# Patient Record
Sex: MALE | Race: WHITE
[De-identification: names, ages, dates, MRNs, and addresses within clinical notes are randomized per-mention and may not be internally consistent; named-entity substitution may affect disease eponyms.]

---

## 2017-02-19 ENCOUNTER — HOSPITAL ENCOUNTER (EMERGENCY)
Dept: HOSPITAL 25 - ED | Age: 67
LOS: 1 days | Discharge: HOME | End: 2017-02-20
Payer: COMMERCIAL

## 2017-02-19 DIAGNOSIS — M51.26: Primary | ICD-10-CM

## 2017-02-19 DIAGNOSIS — M54.5: ICD-10-CM

## 2017-02-19 PROCEDURE — 96372 THER/PROPH/DIAG INJ SC/IM: CPT

## 2017-02-19 PROCEDURE — 72131 CT LUMBAR SPINE W/O DYE: CPT

## 2017-02-19 PROCEDURE — 99283 EMERGENCY DEPT VISIT LOW MDM: CPT

## 2017-02-20 VITALS — SYSTOLIC BLOOD PRESSURE: 115 MMHG | DIASTOLIC BLOOD PRESSURE: 70 MMHG

## 2017-02-20 NOTE — RAD
Indication: Intermittent LEFT side low back pain and difficulty ambulating for 2 weeks.

Multisystem disease.



Comparison: January 12, 2017 CT.



Technique: Noncontrast CT lumbar sacral spine. Multiplanar reformation.



Report: 0.9 cm short axis RIGHT retrocrural lymph node is unchanged. Peripheral vascular

disease without aortoiliac aneurysm. 1.6 cm cortical cyst mid pole RIGHT kidney. No

retroperitoneal hematoma within the field-of-view.



Negative for fracture or spondylolysis at any level.  Slight S-shaped curve to the spine

with dominant LEFT convex curve centered at L3-L4 and L4-L5.



Multilevel advanced degenerative spondylosis and facet joint osteoarthritis.



T12-L1: Facet joint osteoarthritis results in mild RIGHT foraminal stenosis.



L1-L2: Dorsal disc bulge and posterior element hypertrophic arthropathy results in mild

acquired central canal stenosis and mild RIGHT and moderate LEFT foraminal stenosis.



L2-L3: Annular disc bulge and posterior element hypertrophic arthropathy results in

moderate acquired central canal stenosis and mild RIGHT and moderate LEFT foraminal

stenosis.



L3-L4: Dorsal osteophytosis and annular disc bulge along with posterior element

hypertrophic arthropathy results in moderately severe acquired central canal stenosis as

well as moderate bilateral foraminal stenosis.



L4-L5: Dorsal osteophytosis and disc bulge along with advanced posterior element

hypertrophic arthropathy results in severe acquired central canal stenosis as well as

moderately severe bilateral foraminal stenosis.



L5-S1: Dorsal osteophytosis and disc bulge along with posterior element hypertrophic

arthropathy results in moderately severe acquired central canal stenosis as well as

moderate bilateral foraminal stenosis.



IMPRESSION: 

1. No evidence for fracture or traumatic malalignment.

2. Multilevel advanced degenerative spondylosis and posterior element hypertrophic

arthritis with resulting multilevel acquired central canal and foraminal stenosis as

described without significant interval change compared with the January 12, 2017 CT.

Characterization of disc pathology is most accurate with MRI or CT myelography if

clinically feasible.

## 2017-02-20 NOTE — ED
Mary GROSS Matthew, scribed for Rishi Bethea MD on 02/19/17 at 2314 .





Back Pain





- HPI Summary


HPI Summary: 


A 67 y/o male presents to the ED with intermittent lower back pain since two 

week ago. The pain is rated 10/10 in severity. He states that he woke-up one 

morning with the lower back pain, but denies any trauma. The pain initially 

worsened causing him difficulty ambulating then it subsided; however, the pain 

has returned. Its worse with movement. He denies other injuries and abdominal 

pain. The patient took Advil PTA.








- History of Current Complaint


Chief Complaint: EDBackInjuryPain


Stated Complaint: LOWER BACK PAIN


Time Seen by Provider: 02/19/17 22:34


Hx Obtained From: Patient


Onset/Duration: Lasting Weeks


Onset/Duration: Atraumatic, Still Present


Timing: Intermittent


Back Pain Location: Is Discrete @ - lower back


Severity Initially: Moderate


Severity Currently: Moderate


Pain Intensity: 10


Pain Scale Used: 0-10 Numeric


Aggravating Symptom(s): Movement, Walking


Associated Signs And Symptoms: Positive: Other - Difficulty ambulating.  

Negative: Abdominal Pain





- Allergies/Home Medications


Allergies/Adverse Reactions: 


 Allergies











Allergy/AdvReac Type Severity Reaction Status Date / Time


 


PACEMAKER-MRI CONTRAINDICATED Allergy Severe Unknown Uncoded 05/25/16 11:27





   Reaction  





   Details  














PMH/Surg Hx/FS Hx/Imm Hx


Endocrine/Hematology History: Reports: Hx Anticoagulant Therapy - coumadin, Hx 

Blood Transfusions, Hx Diabetes, Hx Unexplained Bleeding - too much coumadin 10 

years ago


   Denies: Hx Blood Disorders, Hx Systemic Lupus Erythematosus, Hx Sickle Cell 

Disease, Hx Thyroid Disease, Hx Anemia


Cardiovascular History: Reports: Hx Angina, Hx Angioplasty, Hx Auto Implanted 

Cardiovert Defib, Hx Cardiac Arrest, Hx Cardiomegaly, Hx Congestive Heart 

Failure, Hx Coronary Artery Disease, Hx Hypercholesterolemia, Hx Hypertension, 

Hx Pacemaker/ICD - PACEMAKER/DIBFIBRILLATOR 18 MONTHS AGO, Hx Syncope, Other 

Cardiovascular Problems/Disorders - IDDM      ON COUMADIN


   Denies: Hx Congenital Heart Disease, Hx Deep Vein Thrombosis, Hx Embolism, 

Hx Hypotension, Hx Peripheral Vascular Disease, Hx Rheumatic Fever, Hx Valvular 

Heart Disease


Respiratory History: Reports: Hx Pleural Effusion - 3 yrs ago, Hx Pneumonia - 

12 years ago, Hx Pulmonary Edema, Hx Seasonal Allergies, Hx Sleep Apnea


   Denies: Hx Asthma, Hx Chronic Bronchitis, Hx Chronic Obstructive Pulmonary 

Disease (COPD), Hx Cystic Fibrosis, Hx Lung Cancer, Hx Pulmonary Embolism


 History: Reports: Hx Kidney Stones - 2007


Sensory History: Reports: Hx Cataracts, Hx Contacts or Glasses - for reading


   Denies: Hx Eye Injury, Hx Eye Prosthesis, Hx Glaucoma, Hx Legally Blind, Hx 

Macular Degeneration, Hx Vision Problem, Hx Deafness, Hx Hearing Aid, Hx 

Hearing Problem


Opthamlomology History: Reports: Hx Cataracts, Hx Contacts or Glasses - for 

reading


   Denies: Hx Eye Injury, Hx Eye Prosthesis, Hx Glaucoma, Hx Legally Blind, Hx 

Macular Degeneration, Hx Vision Problem


Neurological History: Reports: Hx Nerve Disease - neuropathy in both feet


   Denies: Hx Dementia, Hx Developmental Delay, Hx Headaches, Hx Migraine, Hx 

Seizures, Hx Spinal Cord Injury, Hx Transient Ischemic Attacks (TIA), Other 

Neuro Impairments/Disorders


Psychiatric History: Reports: Hx Depression


   Denies: Hx Anxiety, Hx Attention Deficit Hyperactivity Disorder, Hx Eating 

Disorder, Hx Panic Disorder, Hx Post Traumatic Stress Disorder, Hx Inpatient 

Treatment, Hx Community Mental Health Tx, Hx Schizophrenia, Hx Bipolar Disorder

, Hx Suicide Attempt, Hx of Violent Episodes Against Others, Hx Substance Abuse





- Surgical History


Surgery Procedure, Year, and Place: TRANSESOPHAGEAL CARDIAC STUDY X3(TO CHECK 

VALVES); QUADRUPLE BYPASS; 6 STENTS(DOES NOT HAVE CARDS)LAST STENT PLACED 5/2016

; PACEMAKER


Hx Anesthesia Reactions: Yes - spouse states she was told he shouldnt have 

anesthesia again


Infectious Disease History: No


Infectious Disease History: Reports: Hx Shingles - 10 years ago


   Denies: Hx Clostridium Difficile, Hx Hepatitis, Hx Human Immunodeficiency 

Virus (HIV), Hx of Known/Suspected MRSA, Hx Tuberculosis, Hx Known/Suspected VRE

, Hx Known/Suspected VRSA, History Other Infectious Disease, Traveled Outside 

the [place] in Last 30 Days





- Family History


Known Family History: Positive: Cardiac Disease, Hypertension, Diabetes





- Social History


Alcohol Use: Rare


Alcohol Amount: 1-2 beers


Substance Use Type: Reports: None


Smoking Status (MU): Former Smoker





Review of Systems


Constitutional: Negative


Eyes: Negative


ENT: Negative


Cardiovascular: Negative


Respiratory: Negative


Gastrointestinal: Negative


Negative: Abdominal Pain


Genitourinary: Negative


Positive: Myalgia - lower back pain 


Skin: Negative


Neurological: Negative


Psychological: Normal


All Other Systems Reviewed And Are Negative: Yes





Physical Exam


Triage Information Reviewed: Yes


Vital Signs On Initial Exam: 


 Initial Vitals











Temp Pulse Resp BP Pulse Ox


 


 97.3 F   66   18   99/51   100 


 


 02/19/17 21:42  02/19/17 21:42  02/19/17 21:42  02/19/17 21:42  02/19/17 21:42











Vital Signs Reviewed: Yes


Appearance: Positive: Well-Appearing


Skin: Positive: Warm, Skin Color Reflects Adequate Perfusion, Dry


Head/Face: Positive: Normal Head/Face Inspection


Eyes: Positive: Normal


ENT: Positive: Normal ENT inspection


Neck: Positive: Supple, Nontender


Respiratory/Lung Sounds: Positive: Clear to Auscultation, Breath Sounds Present


Cardiovascular: Positive: RRR


Abdomen Description: Positive: Nontender, Soft


Bowel Sounds: Positive: Present


Musculoskeletal: Positive: Other - Positive Straight Leg Raise on the right


Neurological: Positive: Normal, Sensory/Motor Intact, Alert, Oriented to Person 

Place, Time


Psychiatric: Positive: Normal, Affect/Mood Appropriate





Diagnostics





- Vital Signs


 Vital Signs











  Temp Pulse Resp BP Pulse Ox


 


 02/19/17 21:42  97.3 F  66  18  99/51  100














- Laboratory


Lab Statement: Any lab studies that have been ordered have been reviewed, and 

results considered in the medical decision making process.





Back Pain Course/Dx





- Course


Course Of Treatment: Mr. Hines had a lot of DDD in nis lumbar spine and will 

likely continue to have flair ups.  He got a lot of relief from toradol here 

and I will treat him with ibuprofen and ativan as muscle relaxer.





- Diagnoses


Provider Diagnoses: 


 Herniated lumbar intervertebral disc








Discharge





- Discharge Plan


Condition: Stable


Disposition: HOME


Prescriptions: 


LORazepam TAB(*) [Ativan TAB(*)] 1 mg PO Q6H PRN #20 tab MDD 4


 PRN Reason: Pain


Patient Education Materials:  Lumbar Disc Herniation (ED)


Referrals: 


Segundo JAMES,Samuel PASCUAL [Primary Care Provider] - 


Additional Instructions: 


Use ibuprofen in addition to the muscle relaxer to decrease inflammation.





The documentation as recorded by the Mary del cid Matthew accurately 

reflects the service I personally performed and the decisions made by me, Rishi Bethea MD.

## 2017-04-01 NOTE — ED
Reji GROSS Erika, scribed for Chris Helm MD on 04/01/17 at 0600 .





HPI Chest Pain





- HPI Summary


HPI Summary: 


A 67-year-old male presents with his wife to the ED with a CC of intermittent 

chest pain. Patient reports he has been taking NTG for the past week due to 

chest pains, but had not been taking it for 5 years before. Patient had chest 

pain yesterday as well, which improved with the NTG. He woke up again at 03:30 

and developed the pain again. Pt also reports intermittent low BP and dizziness

, with associated SOB. Patient currently denies chest pain. Pt did take 324 mg 

ASA. Hx cardiac stents, CABG, EF less than 15%. Hx 12 MIs.





- History of Current Complaint


Chief Complaint: EDChestPainROMI


Time Seen by Provider: 04/01/17 05:42


Hx Obtained From: Patient, Family/Caretaker - Wife


Onset/Duration: Started Hours Ago, Atraumatic, Still Present


Initial Severity: Moderate


Pain Intensity: 6


Pain Scale Used: 0-10 Numeric


Associated Signs and Symptoms: Positive: Dizziness, Shortness of Breath





- Additional Pertinent History


Primary Care Physician: [id number]





- Allergy/Home Medications


Allergies/Adverse Reactions: 


 Allergies











Allergy/AdvReac Type Severity Reaction Status Date / Time


 


PACEMAKER-MRI CONTRAINDICATED Allergy Severe Unknown Uncoded 05/25/16 11:27





   Reaction  





   Details  











Home Medications: 


 Home Medications





Aspirin [Aspirin 81 MG TAB] 81 mg PO DAILY 04/01/17 [History Confirmed 04/01/17]


Nitroglycerin 0.4 mg SL SEE INSTRUCTIONS 04/01/17 [History Confirmed 04/01/17]


Vitamin E [Lora-E] 400 unit PO DAILY 04/01/17 [History Confirmed 04/01/17]











PMH/Surg Hx/FS Hx/Imm Hx


Endocrine/Hematology History: Reports: Hx Anticoagulant Therapy - coumadin, Hx 

Blood Transfusions, Hx Diabetes, Hx Unexplained Bleeding - too much coumadin 10 

years ago


   Denies: Hx Blood Disorders, Hx Systemic Lupus Erythematosus, Hx Sickle Cell 

Disease, Hx Thyroid Disease, Hx Anemia


Cardiovascular History: Reports: Hx Angina, Hx Angioplasty, Hx Auto Implanted 

Cardiovert Defib, Hx Cardiac Arrest, Hx Cardiomegaly, Hx Congestive Heart 

Failure, Hx Coronary Artery Disease, Hx Hypercholesterolemia, Hx Hypertension, 

Hx Pacemaker/ICD - PACEMAKER/DIBFIBRILLATOR 18 MONTHS AGO, Hx Syncope, Other 

Cardiovascular Problems/Disorders - IDDM      ON COUMADIN


   Denies: Hx Congenital Heart Disease, Hx Deep Vein Thrombosis, Hx Embolism, 

Hx Hypotension, Hx Peripheral Vascular Disease, Hx Rheumatic Fever, Hx Valvular 

Heart Disease


Respiratory History: Reports: Hx Pleural Effusion - 3 yrs ago, Hx Pneumonia - 

12 years ago, Hx Pulmonary Edema, Hx Seasonal Allergies, Hx Sleep Apnea


   Denies: Hx Asthma, Hx Chronic Bronchitis, Hx Chronic Obstructive Pulmonary 

Disease (COPD), Hx Cystic Fibrosis, Hx Lung Cancer, Hx Pulmonary Embolism


 History: Reports: Hx Kidney Stones - 2007


Sensory History: Reports: Hx Cataracts, Hx Contacts or Glasses - for reading


   Denies: Hx Eye Injury, Hx Eye Prosthesis, Hx Glaucoma, Hx Legally Blind, Hx 

Macular Degeneration, Hx Vision Problem, Hx Deafness, Hx Hearing Aid, Hx 

Hearing Problem


Opthamlomology History: Reports: Hx Cataracts, Hx Contacts or Glasses - for 

reading


   Denies: Hx Eye Injury, Hx Eye Prosthesis, Hx Glaucoma, Hx Legally Blind, Hx 

Macular Degeneration, Hx Vision Problem


Neurological History: Reports: Hx Nerve Disease - neuropathy in both feet


   Denies: Hx Dementia, Hx Developmental Delay, Hx Headaches, Hx Migraine, Hx 

Seizures, Hx Spinal Cord Injury, Hx Transient Ischemic Attacks (TIA), Other 

Neuro Impairments/Disorders


Psychiatric History: Reports: Hx Depression


   Denies: Hx Anxiety, Hx Attention Deficit Hyperactivity Disorder, Hx Eating 

Disorder, Hx Panic Disorder, Hx Post Traumatic Stress Disorder, Hx Inpatient 

Treatment, Hx Community Mental Health Tx, Hx Schizophrenia, Hx Bipolar Disorder

, Hx Suicide Attempt, Hx of Violent Episodes Against Others, Hx Substance Abuse





- Surgical History


Surgery Procedure, Year, and Place: TRANSESOPHAGEAL CARDIAC STUDY X3(TO CHECK 

VALVES); QUADRUPLE BYPASS; 6 STENTS(DOES NOT HAVE CARDS)LAST STENT PLACED 5/2016

; PACEMAKER


Hx Anesthesia Reactions: Yes - spouse states she was told he shouldnt have 

anesthesia again


Infectious Disease History: Reports: Hx Shingles - 10 years ago


   Denies: Hx Clostridium Difficile, Hx Hepatitis, Hx Human Immunodeficiency 

Virus (HIV), Hx of Known/Suspected MRSA, Hx Tuberculosis, Hx Known/Suspected VRE

, Hx Known/Suspected VRSA, History Other Infectious Disease, Traveled Outside 

the [place] in Last 30 Days





- Family History


Known Family History: Positive: Cardiac Disease, Hypertension, Diabetes





- Social History


Alcohol Use: Rare


Alcohol Amount: 1-2 beers


Substance Use Type: Reports: None


Hx Tobacco Use: Yes


Smoking Status (MU): Former Smoker





Review of Systems


Negative: Fever, Chills


Negative: Erythema


Negative: Sore Throat


Positive: Chest Pain


Positive: Shortness Of Breath.  Negative: Cough


Negative: Abdominal Pain, Vomiting, Nausea


Negative: dysuria, hematuria


Negative: Myalgia, Edema


Negative: Rash


Neurological: Other - dizziness


All Other Systems Reviewed And Are Negative: Yes





Physical Exam





- Summary


Physical Exam Summary: 


Constitutional: Well-developed, Well-nourished, Alert. (-) Distressed


Skin: Warm, Dry


HENT: Normocephalic; Atraumatic. Soft spoken


Eyes: Conjunctiva normal


Neck: Musculoskeletal ROM normal neck. (-) JVD, (-) Stridor, (-) Tracheal 

deviation


Cardio: Rhythm regular, rate normal, Heart sounds normal; Intact distal pulses; 

The pedal pulses are 2+ and symmetric. Radial pulses are 2+ and symmetric.  (-) 

Murmur


Pulmonary/Chest wall: Effort normal. (-) Respiratory distress, (-) Wheezes, (-) 

Rales


Abd: Soft, (-) Tenderness,  (-) Distension, (-) Guarding, (-) Rebound


Musculoskeletal: (-) Edema


Lymph: (-) Cervical adenopathy


Neuro: Alert, Oriented x3


Psych: Mood and affect Normal


Triage Information Reviewed: Yes


Vital Signs On Initial Exam: 


 Initial Vitals











Pulse Resp BP Pulse Ox


 


 110   12   110/70   100 


 


 04/01/17 05:10  04/01/17 05:10  04/01/17 05:10  04/01/17 05:10











Vital Signs Reviewed: Yes





Diagnostics





- Vital Signs


 Vital Signs











  Pulse Resp BP Pulse Ox


 


 04/01/17 05:10  110  12  110/70  100














- Laboratory


Lab Results: 


 Lab Results











  04/01/17 04/01/17 04/01/17 Range/Units





  05:24 05:24 05:24 


 


WBC  8.7    (3.5-10.8)  10^3/ul


 


RBC  4.21    (4.0-5.4)  10^6/ul


 


Hgb  12.4 L    (14.0-18.0)  g/dl


 


Hct  37 L    (42-52)  %


 


MCV  88    (80-94)  fL


 


MCH  29    (27-31)  pg


 


MCHC  33    (31-36)  g/dl


 


RDW  15    (10.5-15)  %


 


Plt Count  155    (150-450)  10^3/ul


 


MPV  9    (7.4-10.4)  um3


 


Neut % (Auto)  60.9    (38-83)  %


 


Lymph % (Auto)  24.4 L    (25-47)  %


 


Mono % (Auto)  11.2 H    (1-9)  %


 


Eos % (Auto)  2.2    (0-6)  %


 


Baso % (Auto)  1.3    (0-2)  %


 


Absolute Neuts (auto)  5.3    (1.5-7.7)  10^3/ul


 


Absolute Lymphs (auto)  2.1    (1.0-4.8)  10^3/ul


 


Absolute Monos (auto)  1.0 H    (0-0.8)  10^3/ul


 


Absolute Eos (auto)  0.2    (0-0.6)  10^3/ul


 


Absolute Basos (auto)  0.1    (0-0.2)  10^3/ul


 


Absolute Nucleated RBC  0.01    10^3/ul


 


Nucleated RBC %  0.1    


 


Sodium   134   (133-145)  mmol/L


 


Potassium   3.9   (3.5-5.0)  mmol/L


 


Chloride   100 L   (101-111)  mmol/L


 


Carbon Dioxide   27   (22-32)  mmol/L


 


Anion Gap   7   (2-11)  mmol/L


 


BUN   42 H   (6-24)  mg/dL


 


Creatinine   1.61 H   (0.67-1.17)  mg/dL


 


Est GFR ( Amer)   55.5   (>60)  


 


Est GFR (Non-Af Amer)   43.2   (>60)  


 


BUN/Creatinine Ratio   26.1 H   (8-20)  


 


Glucose   303 H   ()  mg/dL


 


Lactic Acid    1.1  (0.5-2.0)  mmol/L


 


Calcium   9.1   (8.6-10.3)  mg/dL


 


Total Bilirubin   0.50   (0.2-1.0)  mg/dL


 


AST   14   (13-39)  U/L


 


ALT   16   (7-52)  U/L


 


Alkaline Phosphatase   62   ()  U/L


 


Troponin I   0.04 H*   (<0.04)  ng/mL


 


Total Protein   7.5   (6.4-8.9)  g/dL


 


Albumin   4.1   (3.2-5.2)  g/dL


 


Globulin   3.4   (2-4)  g/dL


 


Albumin/Globulin Ratio   1.2   (1-3)  











Result Diagrams: 


 04/01/17 05:24





 04/01/17 05:24


Lab Statement: Any lab studies that have been ordered have been reviewed, and 

results considered in the medical decision making process.





- Radiology


  ** CXR


Radiology Interpretation Completed By: ED Physician - mild vascular congestion





- EKG


  ** 05:34


EKG Interpretation: Paced rhythm. ST depressions in V1-V3. No STEMI





Chest Pain Course/Dx





- Diagnoses


Provider Diagnoses: 


 Chest pain, unspecified








- Provider Notifications


Discussed Care Of Patient With: Dr. Garcia (cardiology) at 06:43 - Due to 

low EF, could not do cardiac cath or stress test here. If repeat troponin is 

elevated, transfer patient. If troponin is normal, discuss possible discharge 

with pt's cardiologist.





Discharge





- Discharge Plan


Condition: Stable


Disposition: OTHER


Discharge Disposition Comment: Signed out to Dr. Barboza pending repeat troponin


Referrals: 


Segundo JAMES,Samuel PASCUAL [Primary Care Provider] - 





The documentation as recorded by the Reji del cid Erika accurately reflects 

the service I personally performed and the decisions made by me, Chris Helm MD.

## 2017-04-01 NOTE — ED
Maryse GROSS Rebecca, scribed for Ghanshyam Barboza MD on 04/01/17 at 0853 .





Progress





- Progress Note


Progress Note: 


Pt was a signout from Dr. Helm. He had intermittent chest discomfort 

yesterday with radiation to the neck. At 2200 last night, he had a severe 

episode of discomfort with SOB. He took NTG which alleviated symptoms. 

Discomfort returned and patient came in to [facility] ED around 0500 this morning. His 

first troponin was 0.04. Repeat troponin 3 hours later was 0.04. ELKG showed no 

STEMI. Pt has no chest pain now. His lungs are clear and heart regular. Placed 

a call out to Dr. Herndon, waiting for Dr. Oneill to call back. 





Re-Evaluation





- Re-Evaluation


  ** First Eval


Re-Evaluation Time: 09:44


Change: Improved


Comment: Pt currently is not experiencing chest pain.





  ** Second Eval


Re-Evaluation Time: 10:15


Change: Improved


Comment: Continues to be pain free. Discussed the plan of action with pt and 

his wife. Both agree to pt's D/C and scheduling a followup with Dr. Herndon.





Course/Dx





- Course


Course Of Treatment: Pt was signed out from Dr. Helm. Troponin at 0840 is 0.04

, unchanged from 0524. Discussed care of pt with Dr. Schumacher, who is covering 

for Dr. Herndon and he stated that pt can be D/C to home and call Dr. Herndon on Monday to schedule a follow up. Pt will be D/C to home with a dx 

of chest pain and history of ischemic cardiomyopathy and a followup with Dr. Herndon.





- Diagnoses


Provider Diagnoses: 


 Chest pain, History of ischemic cardiomyopathy








- Provider Notifications


Discussed Care Of Patient With: Called out to Dr. Neely. Call returned by 

Dr. Oneill, who is covering for Dr. Herndon. Stated the 2 negative 

troponins and no evidence of STEMI on EKG. I discussed BP being a bit low and 

he felt like people with cardiomyopathy can have lower BP, that there isnt 

much we can do. Reported he can be D/C to home and call Dr. Herndon's 

office on Monday to schedule a cardiac catheterization.


Time Discussed With Above Provider: 09:57





The documentation as recorded by the Maryse del cid Rebecca accurately 

reflects the service I personally performed and the decisions made by me, Ghanshyam Barboza MD.

## 2017-04-01 NOTE — RAD
INDICATION:  Chest pain.



COMPARISON:  Comparison is made with a prior chest x-ray study from April 22, 2016.



TECHNIQUE: A portable view of the chest was obtained.



FINDINGS: The heart is moderately enlarged and unchanged. The patient is status post

sternotomy and coronary artery bypass surgery. There is a multilead transvenous pacemaker

defibrillator present.



There is mild diffuse prominence of the interstitial markings. No pleural effusion is

seen.



IMPRESSION:  FINDINGS SUGGESTIVE OF MILD CONGESTIVE HEART FAILURE.

## 2018-03-24 NOTE — ED
Back Pain





- HPI Summary


HPI Summary: 





Pt here w/ Lt buttock pain radiating into leg past few days. Worse w/ standing 

straight up. Better bending forward. Denies numbness, tingling, weakness. Has 

DDD - same pain in past - muscle relaxer helped. 





- History of Current Complaint


Chief Complaint: EDBackInjuryPain


Stated Complaint: BACK PAIN


Time Seen by Provider: 03/24/18 15:15


Pain Intensity: 6


Pain Scale Used: 0-10 Numeric





- Allergies/Home Medications


Allergies/Adverse Reactions: 


 Allergies











Allergy/AdvReac Type Severity Reaction Status Date / Time


 


duloxetine [From Cymbalta] AdvReac  Shakes Verified 03/24/18 15:19


 


PACEMAKER-MRI CONTRAINDICATED Allergy Severe Unknown Uncoded 05/25/16 11:27





   Reaction  





   Details  














PMH/Surg Hx/FS Hx/Imm Hx


Endocrine/Hematology History: Reports: Hx Anticoagulant Therapy - coumadin, Hx 

Blood Transfusions, Hx Diabetes, Hx Unexplained Bleeding - too much coumadin 10 

years ago


   Denies: Hx Blood Disorders, Hx Systemic Lupus Erythematosus, Hx Sickle Cell 

Disease, Hx Thyroid Disease, Hx Anemia


Cardiovascular History: Reports: Hx Angina, Hx Angioplasty, Hx Auto Implanted 

Cardiovert Defib, Hx Cardiac Arrest, Hx Cardiomegaly, Hx Congestive Heart 

Failure, Hx Coronary Artery Disease, Hx Hypercholesterolemia, Hx Hypertension, 

Hx Pacemaker/ICD - PACEMAKER/DIBFIBRILLATOR 18 MONTHS AGO, Hx Syncope, Other 

Cardiovascular Problems/Disorders - IDDM      ON COUMADIN


   Denies: Hx Congenital Heart Disease, Hx Deep Vein Thrombosis, Hx Embolism, 

Hx Hypotension, Hx Peripheral Vascular Disease, Hx Rheumatic Fever, Hx Valvular 

Heart Disease


Respiratory History: Reports: Hx Pleural Effusion - 3 yrs ago, Hx Pneumonia - 

12 years ago, Hx Pulmonary Edema, Hx Seasonal Allergies, Hx Sleep Apnea


   Denies: Hx Asthma, Hx Chronic Bronchitis, Hx Chronic Obstructive Pulmonary 

Disease (COPD), Hx Cystic Fibrosis, Hx Lung Cancer, Hx Pulmonary Embolism


 History: Reports: Hx Kidney Stones - 2007


Musculoskeletal History: 


   Denies: Hx Rheumatoid Arthritis, Hx Osteoporosis - OSTEOPENIA


Sensory History: Reports: Hx Cataracts, Hx Contacts or Glasses - for reading


   Denies: Hx Eye Injury, Hx Eye Prosthesis, Hx Glaucoma, Hx Legally Blind, Hx 

Macular Degeneration, Hx Vision Problem, Hx Deafness, Hx Hearing Aid, Hx 

Hearing Problem


Opthamlomology History: Reports: Hx Cataracts, Hx Contacts or Glasses - for 

reading


   Denies: Hx Eye Injury, Hx Eye Prosthesis, Hx Glaucoma, Hx Legally Blind, Hx 

Macular Degeneration, Hx Vision Problem


Neurological History: Reports: Hx Nerve Disease - neuropathy in both feet


   Denies: Hx Dementia, Hx Developmental Delay, Hx Headaches, Hx Migraine, Hx 

Seizures, Hx Spinal Cord Injury, Hx Transient Ischemic Attacks (TIA), Other 

Neuro Impairments/Disorders


Psychiatric History: Reports: Hx Depression


   Denies: Hx Anxiety, Hx Attention Deficit Hyperactivity Disorder, Hx Eating 

Disorder, Hx Panic Disorder, Hx Post Traumatic Stress Disorder, Hx Inpatient 

Treatment, Hx Community Mental Health Tx, Hx Schizophrenia, Hx Bipolar Disorder

, Hx Suicide Attempt, Hx of Violent Episodes Against Others, Hx Substance Abuse





- Surgical History


Surgery Procedure, Year, and Place: TRANSESOPHAGEAL CARDIAC STUDY X3(TO CHECK 

VALVES); QUADRUPLE BYPASS; 6 STENTS(DOES NOT HAVE CARDS)LAST STENT PLACED 5/2016

; PACEMAKER


Hx Anesthesia Reactions: Yes - spouse states she was told he shouldnt have 

anesthesia again


Infectious Disease History: No


Infectious Disease History: Reports: Hx Shingles - 10 years ago


   Denies: Hx Clostridium Difficile, Hx Hepatitis, Hx Human Immunodeficiency 

Virus (HIV), Hx of Known/Suspected MRSA, Hx Tuberculosis, Hx Known/Suspected VRE

, Hx Known/Suspected VRSA, History Other Infectious Disease, Traveled Outside 

the [place] in Last 30 Days





- Family History


Known Family History: Positive: Cardiac Disease, Hypertension, Diabetes





- Social History


Alcohol Use: Rare


Alcohol Amount: 1-2 beers


Substance Use Type: Reports: None


Hx Tobacco Use: Yes


Smoking Status (MU): Former Smoker





Physical Exam


Vital Signs On Initial Exam: 


 Initial Vitals











Temp Pulse Resp BP Pulse Ox


 


 97.9 F   57   14   0/0   97 


 


 03/24/18 14:48  03/24/18 14:48  03/24/18 14:48  03/24/18 14:48  03/24/18 14:48














Diagnostics





- Vital Signs


 Vital Signs











  Temp Pulse Resp BP Pulse Ox


 


 03/24/18 18:28  98.2 F  77  12  00/00  97


 


 03/24/18 18:03    19  


 


 03/24/18 15:40     92/0 


 


 03/24/18 14:48  97.9 F  57  14  0/0  97














- Laboratory


Lab Statement: Any lab studies that have been ordered have been reviewed, and 

results considered in the medical decision making process.





Discharge





- Discharge Plan


Condition: Stable


Disposition: HOME


Prescriptions: 


Diazepam TAB(*) [Valium TAB(*)] 5 mg PO TID PRN #15 tab MDD 3


 PRN Reason: Pain


Patient Education Materials:  Sciatica (ED)


Referrals: 


Segundo JAMES,Samuel PASCUAL [Primary Care Provider] - 


Additional Instructions: 


You appear to have sciatic nerve pain. This may be  related to a muscle spasm 

and/or spinal arthritis flair up. You have been provided with a muscle relaxer 

- this may make you drowsy - be cautious not to fall while taking. In addition 

to trying this, you may apply heat, perform gentle stretches and a topical 

analgesic such as Biofreeze.  Be cautious of other topical analgesics that may 

contain NSAIDs as you are arty on Coumadin.  If symptoms persist, follow-up 

with your PCP this week.  


*If you develop numbness, tingling, weakness, change in bowel or bladder habits

, return to the emergency department.





- Billing Disposition and Condition


Condition: STABLE


Disposition: HOME

## 2019-01-16 NOTE — ED
Complex/Multi-Sys Presentation





- HPI Summary


HPI Summary: 


A 69 y/o male presents to the ED c/o concern for PICC line. As per triage, 

"Patient presents with concerns of right PICC line placement. Patient reports 

"gurgling" sound when flushed with saline. Also LVAD patient. PICC placed at 

[facility] months ago". According to the patient, he is worried because when he 

was getting ready to clean it out and put antibiotics in, he heard some 

"gurgling" sound for the first time ever. His nurse stated that he should go to 

ED for CXR as it was clogged recently. 








- History Of Current Complaint


Chief Complaint: EDGeneral


Time Seen by Provider: 01/16/19 20:25


Hx Obtained From: Patient


Onset/Duration: Sudden Onset, Lasting Minutes, Still Present


Timing: Constant


Severity Currently: None


Character: Unable To Describe


Aggravating Factor(s): NOTHING


Alleviating Factor(s): NOTHING





- Allergies/Home Medications


Allergies/Adverse Reactions: 


 Allergies











Allergy/AdvReac Type Severity Reaction Status Date / Time


 


duloxetine [From Cymbalta] AdvReac  Shakes Verified 03/24/18 15:19


 


PACEMAKER-MRI CONTRAINDICATED Allergy Severe Unknown Uncoded 05/25/16 11:27





   Reaction  





   Details  














PMH/Surg Hx/FS Hx/Imm Hx


Endocrine/Hematology History: Reports: Hx Anticoagulant Therapy - coumadin, Hx 

Blood Transfusions, Hx Diabetes, Hx Unexplained Bleeding - too much coumadin 10 

years ago


   Denies: Hx Blood Disorders, Hx Systemic Lupus Erythematosus, Hx Sickle Cell 

Disease, Hx Thyroid Disease, Hx Anemia


Cardiovascular History: Reports: Hx Angina, Hx Angioplasty, Hx Auto Implanted 

Cardiovert Defib, Hx Cardiac Arrest, Hx Cardiomegaly, Hx Congestive Heart 

Failure, Hx Coronary Artery Disease, Hx Hypercholesterolemia, Hx Hypertension, 

Hx Pacemaker/ICD - PACEMAKER/DIBFIBRILLATOR 18 MONTHS AGO, Hx Syncope, Other 

Cardiovascular Problems/Disorders - IDDM      ON COUMADIN


   Denies: Hx Congenital Heart Disease, Hx Deep Vein Thrombosis, Hx Embolism, 

Hx Hypotension, Hx Peripheral Vascular Disease, Hx Rheumatic Fever, Hx Valvular 

Heart Disease


Respiratory History: Reports: Hx Pleural Effusion - 3 yrs ago, Hx Pneumonia - 

12 years ago, Hx Pulmonary Edema, Hx Seasonal Allergies, Hx Sleep Apnea


   Denies: Hx Asthma, Hx Chronic Bronchitis, Hx Chronic Obstructive Pulmonary 

Disease (COPD), Hx Cystic Fibrosis, Hx Lung Cancer, Hx Pulmonary Embolism


 History: Reports: Hx Kidney Stones - 2007


Musculoskeletal History: 


   Denies: Hx Rheumatoid Arthritis, Hx Osteoporosis - OSTEOPENIA


Sensory History: Reports: Hx Cataracts, Hx Contacts or Glasses - for reading


   Denies: Hx Eye Injury, Hx Eye Prosthesis, Hx Glaucoma, Hx Legally Blind, Hx 

Macular Degeneration, Hx Vision Problem, Hx Deafness, Hx Hearing Aid, Hx 

Hearing Problem


Opthamlomology History: Reports: Hx Cataracts, Hx Contacts or Glasses - for 

reading


   Denies: Hx Eye Injury, Hx Eye Prosthesis, Hx Glaucoma, Hx Legally Blind, Hx 

Macular Degeneration, Hx Vision Problem


Neurological History: Reports: Hx Nerve Disease - neuropathy in both feet


   Denies: Hx Dementia, Hx Developmental Delay, Hx Headaches, Hx Migraine, Hx 

Seizures, Hx Spinal Cord Injury, Hx Transient Ischemic Attacks (TIA), Other 

Neuro Impairments/Disorders


Psychiatric History: Reports: Hx Depression


   Denies: Hx Anxiety, Hx Attention Deficit Hyperactivity Disorder, Hx Eating 

Disorder, Hx Panic Disorder, Hx Post Traumatic Stress Disorder, Hx Inpatient 

Treatment, Hx Community Mental Health Tx, Hx Schizophrenia, Hx Bipolar Disorder

, Hx Suicide Attempt, Hx of Violent Episodes Against Others, Hx Substance Abuse





- Surgical History


Surgery Procedure, Year, and Place: TRANSESOPHAGEAL CARDIAC STUDY X3(TO CHECK 

VALVES); QUADRUPLE BYPASS; 6 STENTS(DOES NOT HAVE CARDS)LAST STENT PLACED 5/2016

; PACEMAKER


Hx Anesthesia Reactions: Yes - spouse states she was told he shouldnt have 

anesthesia again


Infectious Disease History: No


Infectious Disease History: Reports: Hx Shingles - 10 years ago


   Denies: Hx Clostridium Difficile, Hx Hepatitis, Hx Human Immunodeficiency 

Virus (HIV), Hx of Known/Suspected MRSA, Hx Tuberculosis, Hx Known/Suspected VRE

, Hx Known/Suspected VRSA, History Other Infectious Disease, Traveled Outside 

the [place] in Last 30 Days





- Family History


Known Family History: Positive: Cardiac Disease, Hypertension, Diabetes





- Social History


Alcohol Use: Rare


Alcohol Amount: 1-2 beers


Hx Substance Use: No


Substance Use Type: Reports: None


Hx Tobacco Use: Yes - not currently


Smoking Status (MU): Former Smoker





Review of Systems


Negative: Fever


Positive: Other - POSITIVE: CONCERN FOR PICC LINE


All Other Systems Reviewed And Are Negative: Yes





Physical Exam





- Summary


Physical Exam Summary: 


Appearance: Well-appearing, Well-nourished, lying in bed comfortable


Skin: Warm, dry, no obvious rash


Eyes: sclera anicteric, no conjunctival pallor


ENT: mucous membranes moist


Neck: deferred


Respiratory: No signs of respiratory distress


Cardiovascular: Appears well perfused, pulses are nml


Abdomen: deferred


Musculoskeletal: Moving all 4 extremities without obvious discomfort


Neurological: Awake and alert, mentation is normal, speech is fluent and 

appropriate


Psychiatric: affect is normal, does not appear anxious or depressed





Triage Information Reviewed: Yes


Vital Signs On Initial Exam: 


 Initial Vitals











Temp Pulse Resp BP Pulse Ox


 


 98.3 F   73   20   0/0   98 


 


 01/16/19 19:53  01/16/19 19:53  01/16/19 19:53  01/16/19 19:53  01/16/19 19:53











Vital Signs Reviewed: Yes





Diagnostics





- Vital Signs


 Vital Signs











  Temp Pulse Resp BP Pulse Ox


 


 01/16/19 19:53  98.3 F  73  20  0/0  98














- Laboratory


Lab Statement: Any lab studies that have been ordered have been reviewed, and 

results considered in the medical decision making process.





- Radiology


  ** CXR


Radiology Interpretation Completed By: ED Physician


Summary of Radiographic Findings: NO ACTIVE DISEASE. PICC LINE APPEARS NORMAL. 

PENDING OFFICIAL REPORT.





Complex Multi-Symp Course/Dx


Course Of Treatment: A 69 y/o male presents to the ED c/o concern for PICC 

line. According to the patient, he is worried because when he was getting ready 

to clean it out and put antibiotics in, he heard some "gurgling" sound for the 

first time ever. His nurse stated that he should go to ED for CXR as it was 

clogged recently. Physical examination was unremarkable. A CXR revealed no 

acute disease and PICC line appears to be normal. No laboratory screens were 

done. In the ED course, the patient received no medications. Patient will be 

discharged with a diagnosis of PICC line flush. Patient is to follow up with 

primary care provider as needed. Patient is to return to ED for any new or 

worsening symptoms. Patient is agreeable with this plan.





- Diagnoses


Provider Diagnoses: 


 PIC line (peripherally inserted central catheter) flush








Discharge





- Sign-Out/Discharge


Documenting (check all that apply): Patient Departure - DISCHARGE





- Discharge Plan


Condition: Good


Disposition: HOME


Patient Education Materials:  How to Flush Your PICC or Midline Catheter (ED)


Referrals: 


Segundo JAMES,Samuel PASCUAL [Primary Care Provider] - If Needed


Additional Instructions: 


Your PICC catheter appears to be functioning normally, and appears normal on 

the chest x ray. You can go ahead and continue to use it.





FOLLOW UP WITH PRIMARY CARE PROVIDER AS NEEDED.





RETURN TO ED FOR ANY NEW OR WORSENING SYMPTOMS.





- Attestation Statements


Document Initiated by Cass: Yes


Documenting Scribe: Robert Osorio


Provider For Whom Cass is Documenting (Include Credential): Rishi Briones MD


Scribe Attestation: 


I, Robert Osorio, scribed for Rishi Briones MD on 01/16/19 at 2114. 


Status of Scribe Document: Ready

## 2019-06-06 NOTE — ED
Dizziness





- HPI Summary


HPI Summary: 


Patient is a 68 y/o M presenting to ED with complaints of dizziness. He has had 

an LVAD in place for the past 2.5 years. Patient had recent abdominal surgery 

for LVAD pump infection around a week ago at [facility]. He was 

discharged six days ago. Today, patient began to experience dizziness, 

clamminess, nausea and light-headedness. He had been outside driving his riding 

 for around an hour when Sx onset. Wife reports that the patient goes 

in and out of afib. Patient states that he had his pump settings changed from 

5700 to 5800 RPM before his abdominal surgery. He states that he also had 

recent changes to his pacemaker settings, but he is unsure exactly what changes 

were made. Patient reports Hx of 18 MIs, multiple stents and cardiac catheters. 

He denies chest pain. Wife states that she believes the patient's ejection 

fracture to be under 20 for the patient. Pt denies any fever, chills, erythema 

of eyes, sore throat, CP, SOB, cough, abdominal pain, vomiting, dysuria, 

hematuria, myalgia, edema, rash. On triage, pain is rated 0/10. Nothing is 

noted to aggravate/alleviate Sx. Home medications and allergies are reviewed.  








- History Of Current Complaint


Chief Complaint: EDGeneral


Stated Complaint: LIGHT HEADED,DIZZY PER PT


Time Seen by Provider: 06/06/19 19:16


Hx Obtained From: Patient


Onset/Duration: Suddenly


Timing: Hours


Severity Currently: None


Character: Lightheaded, Dizzy


Aggravating Factor(s): Nothing


Alleviating Factor(s): Nothing


Associated Signs And Symptoms: Positive: Nausea, Other: - endorses dizziness, 

light-headedness, claminess; denies any fever, chills, erythema of eyes, sore 

throat, CP, SOB, cough, abdominal pain, vomiting, dysuria, hematuria, myalgia, 

edema, rash.  Negative: Vomiting, Chest Pain, SOB, Fever, Chills





- Allergies/Home Medications


Allergies/Adverse Reactions: 


 Allergies











Allergy/AdvReac Type Severity Reaction Status Date / Time


 


duloxetine [From Cymbalta] AdvReac  Shakes Verified 03/24/18 15:19


 


PACEMAKER-MRI CONTRAINDICATED Allergy Severe Unknown Uncoded 05/25/16 11:27





   Reaction  





   Details  











Home Medications: 


 Home Medications





Gabapentin 300 mg PO TID 06/06/19 [History Confirmed 06/06/19]


Metoprolol Rider/Hydrochlorothiaz [Metoprolol Succinate ER/H 25-12.5 mg] 1 tab PO 

DAILY 06/06/19 [History Confirmed 06/06/19]


Torsemide TAB* 40 mg PO DAILY 06/06/19 [History Confirmed 06/06/19]


Tramadol HCl [Tramadol HCl ER] 100 mg PO QID 06/06/19 [History Confirmed 06/06/ 19]


Warfarin Sodium 8 mg PO DAILY 06/06/19 [History Confirmed 06/06/19]











PMH/Surg Hx/FS Hx/Imm Hx


Endocrine/Hematology History: Reports: Hx Anticoagulant Therapy - coumadin, Hx 

Blood Transfusions, Hx Diabetes, Hx Unexplained Bleeding - too much coumadin 10 

years ago


   Denies: Hx Blood Disorders, Hx Systemic Lupus Erythematosus, Hx Sickle Cell 

Disease, Hx Thyroid Disease, Hx Anemia


Cardiovascular History: Reports: Hx Angina, Hx Angioplasty, Hx Auto Implanted 

Cardiovert Defib, Hx Cardiac Arrest, Hx Cardiomegaly, Hx Congestive Heart 

Failure, Hx Coronary Artery Disease, Hx Hypercholesterolemia, Hx Hypertension, 

Hx Pacemaker/ICD - PACEMAKER/DIBFIBRILLATOR 18 MONTHS AGO, Hx Syncope, Other 

Cardiovascular Problems/Disorders - IDDM      ON COUMADIN


   Denies: Hx Congenital Heart Disease, Hx Deep Vein Thrombosis, Hx Embolism, 

Hx Hypotension, Hx Peripheral Vascular Disease, Hx Rheumatic Fever, Hx Valvular 

Heart Disease


Respiratory History: Reports: Hx Pleural Effusion - 3 yrs ago, Hx Pneumonia - 

12 years ago, Hx Pulmonary Edema, Hx Seasonal Allergies, Hx Sleep Apnea


   Denies: Hx Asthma, Hx Chronic Bronchitis, Hx Chronic Obstructive Pulmonary 

Disease (COPD), Hx Cystic Fibrosis, Hx Lung Cancer, Hx Pulmonary Embolism


 History: Reports: Hx Kidney Stones - 2007


Musculoskeletal History: 


   Denies: Hx Rheumatoid Arthritis, Hx Osteoporosis - OSTEOPENIA


Sensory History: Reports: Hx Cataracts, Hx Contacts or Glasses - for reading


   Denies: Hx Eye Injury, Hx Eye Prosthesis, Hx Glaucoma, Hx Legally Blind, Hx 

Macular Degeneration, Hx Vision Problem, Hx Deafness, Hx Hearing Aid, Hx 

Hearing Problem


Opthamlomology History: Reports: Hx Cataracts, Hx Contacts or Glasses - for 

reading


   Denies: Hx Eye Injury, Hx Eye Prosthesis, Hx Glaucoma, Hx Legally Blind, Hx 

Macular Degeneration, Hx Vision Problem


Neurological History: Reports: Hx Nerve Disease - neuropathy in both feet


   Denies: Hx Dementia, Hx Developmental Delay, Hx Headaches, Hx Migraine, Hx 

Seizures, Hx Spinal Cord Injury, Hx Transient Ischemic Attacks (TIA), Other 

Neuro Impairments/Disorders


Psychiatric History: Reports: Hx Depression


   Denies: Hx Anxiety, Hx Attention Deficit Hyperactivity Disorder, Hx Eating 

Disorder, Hx Panic Disorder, Hx Post Traumatic Stress Disorder, Hx Inpatient 

Treatment, Hx Community Mental Health Tx, Hx Schizophrenia, Hx Bipolar Disorder

, Hx Suicide Attempt, Hx of Violent Episodes Against Others, Hx Substance Abuse





- Surgical History


Surgery Procedure, Year, and Place: TRANSESOPHAGEAL CARDIAC STUDY X3(TO CHECK 

VALVES); QUADRUPLE BYPASS; 6 STENTS(DOES NOT HAVE CARDS)LAST STENT PLACED 5/2016

; PACEMAKER


Hx Anesthesia Reactions: Yes - spouse states she was told he shouldnt have 

anesthesia again


Infectious Disease History: No


Infectious Disease History: Reports: Hx Shingles - 10 years ago


   Denies: Hx Clostridium Difficile, Hx Hepatitis, Hx Human Immunodeficiency 

Virus (HIV), Hx of Known/Suspected MRSA, Hx Tuberculosis, Hx Known/Suspected VRE

, Hx Known/Suspected VRSA, History Other Infectious Disease, Traveled Outside 

the [place] in Last 30 Days





- Family History


Known Family History: Positive: Cardiac Disease, Hypertension, Diabetes





- Social History


Alcohol Use: Rare


Alcohol Amount: 1-2 beers


Hx Substance Use: No


Substance Use Type: Reports: None


Hx Tobacco Use: Yes - not currently


Smoking Status (MU): Former Smoker





Review of Systems


Positive: Skin Diaphoresis - clamminess.  Negative: Fever, Chills


Negative: Erythema


Negative: Sore Throat


Negative: Chest Pain


Negative: Shortness Of Breath, Cough


Positive: Nausea


Negative: dysuria, hematuria


Negative: Myalgia, Edema


Negative: Rash


Neurological: Other - POSITIVE - DIZZINESS


All Other Systems Reviewed And Are Negative: Yes





Physical Exam





- Summary


Physical Exam Summary: 


Constitutional: Well-developed, Well-nourished, Alert. (-) Distressed


Skin: Warm, Dry; There is a left anterior abdominal wound with drainage of 

serosanguineous fluid. There is a left sub-scapular incision with erythema.


HENT: Normocephalic; Atraumatic


Eyes: Conjunctiva normal


Neck: Musculoskeletal ROM normal neck. (-) JVD, (-) Stridor, (-) Tracheal 

deviation


Cardio: No pulses palpable, there is a machine humming sound heard on 

auscultation


Pulmonary/Chest wall: Effort normal. (-) Respiratory distress, (-) Wheezes, (-) 

Rales


Abd: Soft, (-) tenderness, (-) Distension, (-) Guarding, (-) Rebound 


Musculoskeletal: (-) Edema


Lymph: (-) Cervical adenopathy


Neuro: Alert, Oriented x3


Psych: Mood and affect Normal








Triage Information Reviewed: Yes


Vital Signs On Initial Exam: 


 Initial Vitals











Temp Pulse Resp BP Pulse Ox


 


 97.9 F   0   0   0/0   0 


 


 06/06/19 19:04  06/06/19 19:04  06/06/19 19:04  06/06/19 19:04  06/06/19 19:04











Vital Signs Reviewed: Yes





Diagnostics





- Vital Signs


 Vital Signs











  Temp Pulse Resp BP Pulse Ox


 


 06/06/19 19:04  97.9 F  0  0  0/0  0














- Laboratory


Result Diagrams: 


 06/06/19 19:48





 06/06/19 19:48


Lab Statement: Any lab studies that have been ordered have been reviewed, and 

results considered in the medical decision making process.





- Radiology


  ** CXR


Radiology Interpretation Completed By: ED Physician


Summary of Radiographic Findings: Pacer, ICD noted, question left sided 

infiltrate, pending official report.





- EKG


  ** 2000


Cardiac Rate: Other Rate - afib with rate of 114 BPM


EKG Rhythm: Atrial Fibrillation


Summary of EKG Findings: EKG showed afib with rate of 114 BPM, no STEMI, no 

changes.





Re-Evaluation





- Re-Evaluation


  ** First Eval


Re-Evaluation Time: 20:48


Comment: Patient report given to [facility].





Dizzy Course/Dx





- Course


Course Of Treatment: Patient is a 68 y/o M presenting to ED with complaints of 

dizziness. He has had an LVAD in place for the past 2.5 years. Patient had 

recent abdominal surgery for LVAD pump infection around a week ago at [facility]. He was discharged six days ago. Today, patient began to 

experience dizziness, clamminess, nausea and light-headedness. He had been 

outside driving his riding  for around an hour when Sx onset. Wife 

reports that the patient goes in and out of afib. Patient states that he had 

his pump settings changed from 5700 to 5800 RPM before his abdominal surgery. 

He states that he also had recent changes to his pacemaker settings, but he is 

unsure exactly what changes were made. Patient reports Hx of 18 MIs, multiple 

stents and cardiac catheters. He denies chest pain. Wife states that she 

believes the patient's ejection fracture to be under 20 for the patient.  There 

is a left anterior abdominal wound with drainage of serosanguineous fluid. 

There is a left sub-scapular incision with erythema. No pulses palpable, there 

is a machine humming sound on auscultation.  EKG showed afib with rate of 114 

BPM, no STEMI, no changes.  On CXR, Pacer, ICD noted, question left sided 

infiltrate.  Labs showed trop 0.05, digoxin 0.3, lactic acid 2.3, WBC 14.5, RBC 

3.08, Hgb 9.2, Hct 28, RDW 16, myelocytes % 2,  chloride 93, anion gap 12, BUN 

26, creatinine 1.45, glucose 198, alk phos 184.  Fluids were not given due to 

the fact that the patient has LVAD and poor ejection fracture.  During ED course

, patient received Vancomycin HCl 1500 mg in sodium chloride, 250 mls @ 166.667 

mls/hr IVPB, Zosyn 3.375 gm in 100 mls @ 200 mls/hr IVPB.  Patient will be 

transferred to [facility] for his post-op infection and for continuation 

of care. 2114 - Patient's case was discussed with Dr. Downs, Dr. Downs accepts 

for direct admission of patient.





- Diagnoses


Provider Diagnoses: 


 Postoperative infection








- Provider Notifications


Discussed Care Of Patient With: ZITA DOWNS MD


Time Discussed With Above Provider: 21:14


Instructed by Provider To: Other - 2114 - Patient's case was discussed with Dr. Downs, Dr. Downs accepts for direct admission of patient.





- Critical Care Time


Critical Care Time:  min





Discharge





- Sign-Out/Discharge


Documenting (check all that apply): Patient Departure - admit


Patient Received Moderate/Deep Sedation with Procedure: No





- Discharge Plan


Condition: Good


Disposition: TRANS HIGHER LVL OF CARE FAC


Referrals: 


Segundo JAMES,Samuel PASCUAL [Primary Care Provider] - 





- Attestation Statements


Document Initiated by Scribe: Yes


Documenting Scribe: HERMINIO OTT


Provider For Whom Scribe is Documenting (Include Credential): KIRSTEN GOINS MD


Scribe Attestation: 


IHERMINIO, scribed for KIRSTEN GOINS MD on 06/06/19 at 2124. 


Status of Scribe Document: Ready

## 2019-11-24 NOTE — ED
HPI Cardiac





- HPI Summary


HPI Summary: 





This patient is a 69 year old M presenting to [facility] accompanied by wife with a 

chief complaint of begin shocked by defibrillator since after dinner. Pt has 

been shocked before. Pt recently changed his medications. Patient was jerking 

and then he was shocked. Pt has been in [facility], and his LVAD care is usually in 

[place].  Patient denies CP, and SOB.








- History of Current Complaint


Chief Complaint: EDGeneral


Stated Complaint: INTERNAL D-FIB FIRE PER EMS


Time Seen by Provider: 11/24/19 01:25


Hx Obtained From: Patient


Onset/Duration: Started Hours Ago


Timing: Intermittent


Current Severity: None


Pain Intensity: 0


Pain Scale Used: 0-10 Numeric


Character: Irregular


Aggravating Factor(s): Nothing


Alleviating Factor(s): Nothing


Associated Signs and Symptoms: Positive: Other: - jerking.  Negative: Chest Pain

, Shortness of Breath





- Additional Pertinent History


Primary Care Physician: RANJAN





- Allergy/Home Medications


Allergies/Adverse Reactions: 


 Allergies











Allergy/AdvReac Type Severity Reaction Status Date / Time


 


duloxetine [From Cymbalta] AdvReac  Shakes Verified 03/24/18 15:19


 


PACEMAKER-MRI CONTRAINDICATED Allergy Severe Unknown Uncoded 05/25/16 11:27





   Reaction  





   Details  











Home Medications: 


 Home Medications





Acetaminophen TAB* [Tylenol TAB*] 650 mg PO Q4H PRN 11/24/19 [History Confirmed 

11/24/19]


Ferrous Sulfate TAB* 325 mg PO DAILY 11/24/19 [History Confirmed 11/24/19]


Insulin Lispro [Humalog Kwikpen] 16 unit SUBCUT TID 11/24/19 [History Confirmed 

11/24/19]


Metoprolol Succinate XL TAB* [Toprol XL TAB*] 50 mg PO DAILY 11/24/19 [History 

Confirmed 11/24/19]


Polyethylene Glycol 3350* [Miralax*] 17 gm PO DAILY 11/24/19 [History Confirmed 

11/24/19]


Sennosides [Senna] 8.6 mg PO DAILY 11/24/19 [History Confirmed 11/24/19]


Spironolactone [Aldactone 25 MG-] 25 mg PO DAILY 11/24/19 [History Confirmed 11/ 24/19]


Torsemide TAB* [Demadex*] 20 mg PO DAILY 11/24/19 [History Confirmed 11/24/19]


Warfarin TAB(*) [Coumadin TAB(*)] 4 mg PO SEE INSTRUCTIONS 11/24/19 [History 

Confirmed 11/24/19]


amLODIPine TAB* [Norvasc 5 mg TAB*] 10 mg PO DAILY 11/24/19 [History Confirmed 

11/24/19]


rOPINIRole TAB* [Requip TAB*] 2 mg PO BEDTIME 11/24/19 [History Confirmed 11/24/ 19]











PMH/Surg Hx/FS Hx/Imm Hx


Endocrine/Hematology History: Reports: Hx Anticoagulant Therapy - coumadin, Hx 

Blood Transfusions, Hx Diabetes, Hx Unexplained Bleeding - too much coumadin 10 

years ago


   Denies: Hx Blood Disorders, Hx Systemic Lupus Erythematosus, Hx Sickle Cell 

Disease, Hx Thyroid Disease, Hx Anemia


Cardiovascular History: Reports: Hx Angina, Hx Angioplasty, Hx Auto Implanted 

Cardiovert Defib, Hx Cardiac Arrest, Hx Cardiomegaly, Hx Congestive Heart 

Failure, Hx Coronary Artery Disease, Hx Hypercholesterolemia, Hx Hypertension, 

Hx Pacemaker/ICD - PACEMAKER/DIBFIBRILLATOR 18 MONTHS AGO, Hx Syncope, Other 

Cardiovascular Problems/Disorders - IDDM      ON COUMADIN


   Denies: Hx Congenital Heart Disease, Hx Deep Vein Thrombosis, Hx Embolism, 

Hx Hypotension, Hx Peripheral Vascular Disease, Hx Rheumatic Fever, Hx Valvular 

Heart Disease


Respiratory History: Reports: Hx Pleural Effusion - 3 yrs ago, Hx Pneumonia - 

12 years ago, Hx Pulmonary Edema, Hx Seasonal Allergies, Hx Sleep Apnea


   Denies: Hx Asthma, Hx Chronic Bronchitis, Hx Chronic Obstructive Pulmonary 

Disease (COPD), Hx Cystic Fibrosis, Hx Lung Cancer, Hx Pulmonary Embolism


 History: Reports: Hx Kidney Stones - 2007


Musculoskeletal History: 


   Denies: Hx Rheumatoid Arthritis, Hx Osteoporosis - OSTEOPENIA


Sensory History: Reports: Hx Cataracts, Hx Contacts or Glasses - for reading


   Denies: Hx Eye Injury, Hx Eye Prosthesis, Hx Glaucoma, Hx Legally Blind, Hx 

Macular Degeneration, Hx Vision Problem, Hx Deafness, Hx Hearing Aid, Hx 

Hearing Problem


Opthamlomology History: Reports: Hx Cataracts, Hx Contacts or Glasses - for 

reading


   Denies: Hx Eye Injury, Hx Eye Prosthesis, Hx Glaucoma, Hx Legally Blind, Hx 

Macular Degeneration, Hx Vision Problem


Neurological History: Reports: Hx Nerve Disease - neuropathy in both feet


   Denies: Hx Dementia, Hx Developmental Delay, Hx Headaches, Hx Migraine, Hx 

Seizures, Hx Spinal Cord Injury, Hx Transient Ischemic Attacks (TIA), Other 

Neuro Impairments/Disorders


Psychiatric History: Reports: Hx Depression


   Denies: Hx Anxiety, Hx Attention Deficit Hyperactivity Disorder, Hx Eating 

Disorder, Hx Panic Disorder, Hx Post Traumatic Stress Disorder, Hx Inpatient 

Treatment, Hx Community Mental Health Tx, Hx Schizophrenia, Hx Bipolar Disorder

, Hx Suicide Attempt, Hx of Violent Episodes Against Others, Hx Substance Abuse





- Surgical History


Surgery Procedure, Year, and Place: TRANSESOPHAGEAL CARDIAC STUDY X3(TO CHECK 

VALVES); QUADRUPLE BYPASS; 6 STENTS(DOES NOT HAVE CARDS)LAST STENT PLACED 5/2016

; PACEMAKER


Hx Anesthesia Reactions: Yes - spouse states she was told he shouldnt have 

anesthesia again


Infectious Disease History: No


Infectious Disease History: Reports: Hx Shingles - 10 years ago


   Denies: Hx Clostridium Difficile, Hx Hepatitis, Hx Human Immunodeficiency 

Virus (HIV), Hx of Known/Suspected MRSA, Hx Tuberculosis, Hx Known/Suspected VRE

, Hx Known/Suspected VRSA, History Other Infectious Disease, Traveled Outside 

the [place] in Last 30 Days





- Family History


Known Family History: Positive: Cardiac Disease, Hypertension, Diabetes





- Social History


Alcohol Use: Rare


Alcohol Amount: 1-2 beers


Hx Substance Use: No


Substance Use Type: Reports: None


Hx Tobacco Use: Yes - not currently


Smoking Status (MU): Former Smoker





- Additional Comments


History Additional Comments: 





 Home Medications











 Medication  Instructions  Recorded  Confirmed  Type


 


Insulin GLARGINE(*) [Lantus(*)] 30 units SUBCUT BEDTIME 02/20/16 11/24/19 

History


 


Lisinopril TAB* [Prinivil TAB 10 5 mg PO DAILY 02/20/16 11/24/19 History





MG*]    


 


Ascorbic Acid [Vitamin C] 2,000 mg PO DAILY 01/19/17 11/24/19 History


 


Digoxin TAB* [Lanoxin TAB*] 125 mcg PO DAILY 01/19/17 11/24/19 History


 


Aspirin [Aspirin 81 MG TAB] 81 mg PO DAILY 04/01/17 11/24/19 History


 


Gabapentin 600 mg PO QID 06/06/19 11/24/19 History


 


Tramadol HCl [Tramadol HCl ER] 100 mg PO TID PRN 06/06/19 11/24/19 History


 


Warfarin Sodium 6 mg PO SEE INSTRUCTIONS 06/06/19 11/24/19 History


 


Acetaminophen TAB* [Tylenol TAB*] 650 mg PO Q4H PRN 11/24/19 11/24/19 History


 


Ferrous Sulfate TAB* 325 mg PO DAILY 11/24/19 11/24/19 History


 


Insulin Lispro [Humalog Kwikpen] 16 unit SUBCUT TID 11/24/19 11/24/19 History


 


Metoprolol Succinate XL TAB* 50 mg PO DAILY 11/24/19 11/24/19 History





[Toprol XL TAB*]    


 


Polyethylene Glycol 3350* 17 gm PO DAILY 11/24/19 11/24/19 History





[Miralax*]    


 


Sennosides [Senna] 8.6 mg PO DAILY 11/24/19 11/24/19 History


 


Spironolactone [Aldactone 25 MG-] 25 mg PO DAILY 11/24/19 11/24/19 History


 


Torsemide TAB* [Demadex*] 20 mg PO DAILY 11/24/19 11/24/19 History


 


Warfarin TAB(*) [Coumadin TAB(*)] 4 mg PO SEE INSTRUCTIONS 11/24/19 11/24/19 

History


 


amLODIPine TAB* [Norvasc 5 mg TAB*] 10 mg PO DAILY 11/24/19 11/24/19 History


 


rOPINIRole TAB* [Requip TAB*] 2 mg PO BEDTIME 11/24/19 11/24/19 History














Review of Systems


Negative: Chest Pain


Negative: Shortness Of Breath


Positive: Other - jerking


All Other Systems Reviewed And Are Negative: Yes





Physical Exam





- Summary


Physical Exam Summary: 





General: Elderly male. Mildly ill appearing. No acute distress.


HEENT: Normocephalic, Atraumatic. 


              Eyes: Conjuctiva normal, PERRL.


              Ears: TMs within normal limits.


              Nares: (-) discharge, (-) erythema.


              Oropharynx: Clear, mucous membranes moist, (-) exudates. 


Neck: Soft, FROM, (-) lymphadenopathy, (-) thyromegaly, (-) JVD.


Cardiovascular: Irregularly irregular, (-) murmur.


Lungs: Clear to auscultation bilaterally (-) wheezes, (-) rales, (-) rhonchi.


Abdomen: Soft, non-tender, non-distended, (-) organomegaly, normal bowel sounds.


Back: (-) CVA tenderness


Extremities: No edema.


Skin: Warm, dry, (-) rash.


Neuro: Alert and oriented x3, no focal deficits.


Psychiatric: Mood sleepy, affect normal.





Triage Information Reviewed: Yes


Vital Signs On Initial Exam: 


 Initial Vitals











Temp Pulse Resp BP Pulse Ox


 


 100.4 F   111   18   78/0   94 


 


 11/24/19 01:26  11/24/19 01:26  11/24/19 01:26  11/24/19 01:26  11/24/19 01:26











Vital Signs Reviewed: Yes





Procedures





- Sedation


Patient Received Moderate/Deep Sedation with Procedure: No





Diagnostics





- Vital Signs


 Vital Signs











  Temp Pulse Resp BP Pulse Ox


 


 11/24/19 01:36   113  22   93


 


 11/24/19 01:26  100.4 F  111  18  78/0  94














- Laboratory


Lab Results: 


 Lab Results











  11/24/19 Range/Units





  01:50 


 


WBC  31.1 H  (3.5-10.8)  10^3/uL


 


RBC  3.27 L  (4.18-5.48)  10^6 /uL


 


Hgb  10.0 L  (14.0-18.0)  g/dL


 


Hct  30 L  (42-52)  %


 


MCV  92  (80-94)  fL


 


MCH  31  (27-31)  pg


 


MCHC  34  (31-36)  g/dL


 


RDW  16 H  (10-15)  %


 


Plt Count  207  (150-450)  10^3/uL


 


MPV  8.1  (7.4-10.4)  fL


 


Neut % (Auto)  Pending  


 


Lymph % (Auto)  Pending  


 


Mono % (Auto)  Pending  


 


Eos % (Auto)  Pending  


 


Baso % (Auto)  Pending  


 


Absolute Neuts (auto)  Pending  


 


Absolute Lymphs (auto)  Pending  


 


Absolute Monos (auto)  Pending  


 


Absolute Eos (auto)  Pending  


 


Absolute Basos (auto)  Pending  


 


Absolute Nucleated RBC  Pending  


 


Nucleated RBC %  Pending  











Result Diagrams: 


 11/24/19 01:50





 11/24/19 01:50


Lab Statement: Any lab studies that have been ordered have been reviewed, and 

results considered in the medical decision making process.





- Radiology


  ** CXR


Radiology Interpretation Completed By: ED Physician


Summary of Radiographic Findings: CXR reveals, per ED physician, No infiltrate. 

No pleural effusion





- EKG


  ** 0125


EKG Rhythm: Atrial Fibrillation


Summary of EKG Findings: EKG at 0125 reveals atrial fib 126 BPM, no signal 

changes, no ischemic changes. This EKG was reviewed and interpreted by Dr. Lee.





Re-Evaluation





- Re-Evaluation


  ** First Eval


Re-Evaluation Time: 02:38


Comment: Discussed case with pt wife, pt has a LVAD infection, started on pills

, and then started on IV antibiotics, this Easter he went into sepsis.





Disposition





- Course


Course Of Treatment: 69 year old M sent from home by ambulance, after his ICD 

went off 4x at rest, pt also had 2 episodes on route and 3 episode in ED. He 

denies CP or SOB in between ICD firing. Pt had a LVAD implanted 3 years ago and 

has had problems with infection consistently since right after implantation. 

Most recently pt has been on meropenem through a picc line TID, his picc line 

dislodged on Tuesday and antiobiotics were held at that time. Work up here 

shows temp of 104 F upon arrival, sleepiness and mild confusion. WBC of 31, 

Lactic Acid 2.3. Troponin returned at 0.17. Interrogation of ICD demonstrated V 

Tach prior to each episode of firing. Cardiology on call consulted. Pt given 

dose of lidocaine and amiodarone drip started for possible LVAD site infection. 

Meropenem 2 g given. Pt accepts in transfer to [facility] by LVAD 

team and Dr. Muro.





- Diagnoses


Provider Diagnoses: 


 V tach, AICD (automatic cardioverter/defibrillator) present, Wound infection








- Physician Notifications


Discussed Care Of Patient With: Ethan Blood MD


Time Discussed With Above Provider: 02:14


Instructed by Provider To: Other - 02:14  Discussed case with Dr. Blood 02:59 

 Discussed case with [place] [id number]  Transfer center, lost the call about 3 

times, put through to a PA in ED 03:17  Dr. Muro accepting physician


Reason For Transfer: Specialty or service not available at [facility].





Discharge ED





- Sign-Out/Discharge


Documenting (check all that apply): Patient Departure - Transfer





- Discharge Plan


Condition: Stable


Disposition: TRANS HIGHER LVL OF CARE FAC


Referrals: 


Segundo JAMES,Samuel PASCUAL [Primary Care Provider] - 





- Billing Disposition and Condition


Condition: STABLE


Disposition: Trans Higher Lvl of Care Fac





- Attestation Statements


Document Initiated by Scribe: Yes


Documenting Scribe: Rebecca Preston


Provider For Whom Scribe is Documenting (Include Credential): Dr. Shira Lee MD 


Scribe Attestation: 


IRebecca, scribed for Dr. Shira Lee MD  on 11/24/19 at 0349. 


Scribe Documentation Reviewed: Yes


Provider Attestation: 


The documentation as recorded by the scribe, Rebecca Preston accurately 

reflects the service I personally performed and the decisions made by me, Dr. Shira Lee MD 


Status of Scribe Document: Viewed

## 2019-11-24 NOTE — PN
Progress Note





- Progress Note


Date of Service: 11/24/19


Note: 





Microbiology called at 4:45 PM to state preliminary aerobic blood culture 

positive for gram-negative bacilli.  Patient was already transferred to [facility]   Information has been forwarded to [facility] by sawyer clerk.

## 2019-11-26 NOTE — ED
Imaging and Labs Follow Up


Follow Up Type: Labs/Cultures


Labs/Culture Result: 





Blood cultures 2 resulted as pseudomonas aeruginosa.  This patient was 

transferred to [facility].  Information was sent to this facility.


Patient Communication/Plan: 





Patient being treated at [facility].  Impression transferred to 

this facility.  Nothing further at this time.


Provider Diagnoses: 


 V tach, AICD (automatic cardioverter/defibrillator) present, Wound infection

## 2019-12-18 NOTE — ED
Complex/Multi-Sys Presentation





- HPI Summary


HPI Summary: 


Patient is a 70 y/o M w/ LVAD who presents to [facility] with complaints of elevated 

INR, elevated WBC, generalized weakness, difficulty with ambulation, back pain, 

neck pain, right upper leg pain, and decreased PO intake. He states that he 

gets weekly blood work done at [facility] every Monday. Bloodwork was done 

12/16/19. He was called today, 12/18/19, with regards to abnormally elevated 

INR of 8.9 and elevated WBC. Patient states that his wife and Luis Miguel advised 

him to come to ED for evaluation. He also reports experiencing generalized 

weakness and difficulty with ambulation for the past two weeks. He also states, 

"I'm always tired". Back pain, neck pain, right upper leg pain, and decreased 

PO intake noted as well. No fever, cough, vomiting, diarrhea, dysuria reported. 

He reports chronic tingling in his fingers secondary to neuropathy. He takes 

Tylenol for his pain, with last dose being four hours ago. On RN assessment, 

nothing is noted to aggravate/alleviate Sx. Home medications and allergies are 

reviewed. 








- History Of Current Complaint


Chief Complaint: EDGeneral


Time Seen by Provider: 12/18/19 20:13


Hx Obtained From: Patient


Onset/Duration: Lasting Weeks - generalized weakness, Still Present


Timing: Constant, Weeks - generalized weakness


Location: Pain At: - back, neck, right upper leg


Aggravating Factor(s): nothing


Alleviating Factor(s): nothing


Associated Signs And Symptoms: Positive: Back Pain, Other - positive - elevated 

INR, elevated WBC, fatigue, neck pain, right upper leg pain, decreased PO 

intake.  Negative: Cough, Vomiting, Diarrhea, Dysuria, Fever





- Allergies/Home Medications


Allergies/Adverse Reactions: 


 Allergies











Allergy/AdvReac Type Severity Reaction Status Date / Time


 


duloxetine [From Cymbalta] AdvReac  Shakes Verified 12/18/19 19:50


 


PACEMAKER-MRI CONTRAINDICATED Allergy Severe Unknown Uncoded 12/18/19 19:50





   Reaction  





   Details  











Home Medications: 


 Home Medications





Acetaminophen [Acetaminophen Extra Strength] 1,000 mg PO Q8HR PRN 12/18/19 [

History Confirmed 12/18/19]


Amiodarone TAB* [Cordarone TAB*] 200 mg PO BID 12/18/19 [History Confirmed 12/18 /19]


Aspirin EC TAB* [Ecotrin EC Low Dose 81 MG*] 81 mg PO DAILY 12/18/19 [History 

Confirmed 12/18/19]


Calcium Carbonate 650 mg PO BID WITH MEALS 12/18/19 [History Confirmed 12/18/19]


Fluconazole 100 MG TAB* [Diflucan 100 MG TAB*] 400 mg PO DAILY 12/18/19 [

History Confirmed 12/18/19]


Gabapentin TAB(NF) [Neurontin 600 mg TAB(NF)] 600 mg PO QID 12/18/19 [History 

Confirmed 12/18/19]


Insulin Glargine,Hum.rec.anlog [Basaglar Kwikpen] 30 unit SUBCUT QPM 12/18/19 [

History Confirmed 12/18/19]


Insulin LISPRO* [HumaLOG*] 16 units SUBCUT TID WITH MEALS 12/18/19 [History 

Confirmed 12/18/19]


Magnesium Oxide TAB* [MagOx 400 TAB*] 400 mg PO BID 12/18/19 [History Confirmed 

12/18/19]


Melatonin [Meladox] 3 mg PO BEDTIME PRN 12/18/19 [History Confirmed 12/18/19]


Methyl Salicylate/Menth/Camph [Bengay Ultra Strength] 1 cre TOPICAL TID PRN 12/ 18/19 [History Confirmed 12/18/19]


Senna TAB 8.6 mg* [Senokot 8.6 mg TAB*] 1 tab PO QPM 12/18/19 [History 

Confirmed 12/18/19]


Torsemide 10 mg PO DAILY 12/18/19 [History Confirmed 12/18/19]


Warfarin TAB(*) [Coumadin TAB(*)] 6 mg PO DAILY 12/18/19 [History Confirmed 12/ 18/19]


rOPINIRole TAB* [Requip TAB*] 1 mg PO QID 12/18/19 [History Confirmed 12/18/19]


traMADol TAB* [Ultram*] 50 mg PO Q4HR PRN 12/18/19 [History Confirmed 12/18/19]











PMH/Surg Hx/FS Hx/Imm Hx


Endocrine/Hematology History: Reports: Hx Anticoagulant Therapy - coumadin, Hx 

Blood Transfusions, Hx Diabetes, Hx Unexplained Bleeding - too much coumadin 10 

years ago


   Denies: Hx Blood Disorders, Hx Systemic Lupus Erythematosus, Hx Sickle Cell 

Disease, Hx Thyroid Disease, Hx Anemia


Cardiovascular History: Reports: Hx Angina, Hx Angioplasty, Hx Auto Implanted 

Cardiovert Defib, Hx Cardiac Arrest, Hx Cardiomegaly, Hx Congestive Heart 

Failure, Hx Coronary Artery Disease, Hx Hypercholesterolemia, Hx Hypertension, 

Hx Pacemaker/ICD - PACEMAKER/DIBFIBRILLATOR 18 MONTHS AGO, Hx Syncope, Other 

Cardiovascular Problems/Disorders - IDDM      ON COUMADIN


   Denies: Hx Congenital Heart Disease, Hx Deep Vein Thrombosis, Hx Embolism, 

Hx Hypotension, Hx Peripheral Vascular Disease, Hx Rheumatic Fever, Hx Valvular 

Heart Disease


Respiratory History: Reports: Hx Pleural Effusion - 3 yrs ago, Hx Pneumonia - 

12 years ago, Hx Pulmonary Edema, Hx Seasonal Allergies, Hx Sleep Apnea


   Denies: Hx Asthma, Hx Chronic Bronchitis, Hx Chronic Obstructive Pulmonary 

Disease (COPD), Hx Cystic Fibrosis, Hx Lung Cancer, Hx Pulmonary Embolism


 History: Reports: Hx Kidney Stones - 2007


Musculoskeletal History: 


   Denies: Hx Rheumatoid Arthritis, Hx Osteoporosis - OSTEOPENIA


Sensory History: Reports: Hx Cataracts, Hx Contacts or Glasses - for reading


   Denies: Hx Eye Injury, Hx Eye Prosthesis, Hx Glaucoma, Hx Legally Blind, Hx 

Macular Degeneration, Hx Vision Problem, Hx Deafness, Hx Hearing Aid, Hx 

Hearing Problem


Opthamlomology History: Reports: Hx Cataracts, Hx Contacts or Glasses - for 

reading


   Denies: Hx Eye Injury, Hx Eye Prosthesis, Hx Glaucoma, Hx Legally Blind, Hx 

Macular Degeneration, Hx Vision Problem


Neurological History: Reports: Hx Nerve Disease - neuropathy in both feet


   Denies: Hx Dementia, Hx Developmental Delay, Hx Headaches, Hx Migraine, Hx 

Seizures, Hx Spinal Cord Injury, Hx Transient Ischemic Attacks (TIA), Other 

Neuro Impairments/Disorders


Psychiatric History: Reports: Hx Depression


   Denies: Hx Anxiety, Hx Attention Deficit Hyperactivity Disorder, Hx Eating 

Disorder, Hx Panic Disorder, Hx Post Traumatic Stress Disorder, Hx Inpatient 

Treatment, Hx Community Mental Health Tx, Hx Schizophrenia, Hx Bipolar Disorder

, Hx Suicide Attempt, Hx of Violent Episodes Against Others, Hx Substance Abuse





- Surgical History


Surgery Procedure, Year, and Place: TRANSESOPHAGEAL CARDIAC STUDY X3(TO CHECK 

VALVES); QUADRUPLE BYPASS; 6 STENTS(DOES NOT HAVE CARDS)LAST STENT PLACED 5/2016

; PACEMAKER


Hx Anesthesia Reactions: Yes - spouse states she was told he shouldnt have 

anesthesia again


Infectious Disease History: No


Infectious Disease History: Reports: Hx Shingles - 10 years ago


   Denies: Hx Clostridium Difficile, Hx Hepatitis, Hx Human Immunodeficiency 

Virus (HIV), Hx of Known/Suspected MRSA, Hx Tuberculosis, Hx Known/Suspected VRE

, Hx Known/Suspected VRSA, History Other Infectious Disease, Traveled Outside 

the [place] in Last 30 Days





- Family History


Known Family History: Positive: Cardiac Disease, Hypertension, Diabetes





- Social History


Alcohol Use: Rare


Alcohol Amount: 1-2 beers


Hx Substance Use: No


Substance Use Type: Reports: None


Hx Tobacco Use: Yes - not currently


Smoking Status (MU): Former Smoker





Review of Systems





- ROS Summary


Review of Systems Summary: 


Home Medications











 Medication  Instructions  Recorded  Confirmed  Type


 


Lisinopril TAB* [Prinivil TAB 10 5 mg PO DAILY 02/20/16 11/24/19 History





MG*]    


 


Ferrous Sulfate TAB* 325 mg PO DAILY 11/24/19 11/24/19 History


 


Metoprolol Succinate XL TAB* 50 mg PO DAILY 11/24/19 11/24/19 History





[Toprol XL TAB*]    


 


Polyethylene Glycol 3350* 17 gm PO DAILY 11/24/19 11/24/19 History





[Miralax*]    


 


Spironolactone [Aldactone 25 MG-] 25 mg PO DAILY 11/24/19 11/24/19 History


 


amLODIPine TAB* [Norvasc 5 mg TAB*] 10 mg PO DAILY 11/24/19 11/24/19 History


 


Acetaminophen [Acetaminophen Extra 1,000 mg PO Q8HR PRN 12/18/19 12/18/19 

History





Strength]    


 


Amiodarone TAB* [Cordarone TAB*] 200 mg PO BID 12/18/19 12/18/19 History


 


Aspirin EC TAB* [Ecotrin EC Low 81 mg PO DAILY 12/18/19 12/18/19 History





Dose 81 MG*]    


 


Calcium Carbonate 650 mg PO BID WITH MEALS 12/18/19 12/18/19 History


 


Fluconazole 100 MG TAB* [Diflucan 400 mg PO DAILY 12/18/19 12/18/19 History





100 MG TAB*]    


 


Gabapentin TAB(NF) [Neurontin 600 600 mg PO QID 12/18/19 12/18/19 History





mg TAB(NF)]    


 


Insulin Glargine,Hum.rec.anlog 30 unit SUBCUT QPM 12/18/19 12/18/19 History





[Basaglar Kwikpen]    


 


Insulin LISPRO* [HumaLOG*] 16 units SUBCUT TID WITH MEALS 12/18/19 12/18/19 

History


 


Magnesium Oxide TAB* [MagOx 400 400 mg PO BID 12/18/19 12/18/19 History





TAB*]    


 


Melatonin [Meladox] 3 mg PO BEDTIME PRN 12/18/19 12/18/19 History


 


Methyl Salicylate/Menth/Camph 1 cre TOPICAL TID PRN 12/18/19 12/18/19 History





[Bengay Ultra Strength]    


 


Senna TAB 8.6 mg* [Senokot 8.6 mg 1 tab PO QPM 12/18/19 12/18/19 History





TAB*]    


 


Torsemide 10 mg PO DAILY 12/18/19 12/18/19 History


 


Warfarin TAB(*) [Coumadin TAB(*)] 6 mg PO DAILY 12/18/19 12/18/19 History


 


rOPINIRole TAB* [Requip TAB*] 1 mg PO QID 12/18/19 12/18/19 History


 


traMADol TAB* [Ultram*] 50 mg PO Q4HR PRN 12/18/19 12/18/19 History














Constitutional: Other - positive - elevated WBC and INR 


Positive: Fatigue.  Negative: Fever


Negative: Cough


Gastrointestinal: Other - positive - decreased PO intake 


Negative: Vomiting, Diarrhea


Negative: dysuria


Musculoskeletal: Other - positive - neck pain, back pain, right upper leg pain 


All Other Systems Reviewed And Are Negative: Yes





Physical Exam





- Summary


Physical Exam Summary: 


General: Well-developed, Well-nourished, Moderately Ill-Appearing Male. No 

acute distress.


HEENT: Normocephalic, Atraumatic. 


              Eyes: Conjuctiva normal, PERRL.


              Oropharynx: Clear, mucous membranes moist, (-) exudates. 


Neck: Soft, FROM, (-) lymphadenopathy, (-) thyromegaly, (-) JVD.


Cardiovascular: Normal sinus rhythm, (-) murmur.


Lungs: Decreased breath sounds bilaterally and bibasilar crackles are noted; (-

) wheezes, (-) rales, (-) rhonchi.


Abdomen: Soft, non-tender, non-distended, (-) organomegaly, normal bowel sounds.


Back: (-) CVA tenderness


Extremities: Venous stasis changes and trace BLE edema 


Skin: Warm, dry, (-) rash.


Neuro: Alert and oriented x3, no focal deficits.


Psychiatric: Mood normal, affect normal.








Triage Information Reviewed: Yes


Vital Signs On Initial Exam: 


 Initial Vitals











Temp Pulse Resp BP Pulse Ox


 


 98 F   80   20   72/0   0 


 


 12/18/19 19:37  12/18/19 19:37  12/18/19 19:37  12/18/19 19:37  12/18/19 19:37











Vital Signs Reviewed: Yes





Procedures





- Sedation


Patient Received Moderate/Deep Sedation with Procedure: No





Diagnostics





- Vital Signs


 Vital Signs











  Temp Pulse Resp BP Pulse Ox


 


 12/18/19 19:37  98 F  80  20  72/0  0














- Laboratory


Result Diagrams: 


 12/18/19 20:15





 12/18/19 20:15


Lab Statement: Any lab studies that have been ordered have been reviewed, and 

results considered in the medical decision making process.





- Radiology


  ** CXR


Radiology Interpretation Completed By: ED Physician


Summary of Radiographic Findings: INCREASED INTERSTITIAL MARKINGS, CONSISTENT 

WITH FLUID OVERLOAD, PENDING OFFICIAL REPORT.





- EKG


  ** 2036


Cardiac Rate: NL - rate of 86 BPM


EKG Rhythm: Sinus Rhythm


Summary of EKG Findings: EKG at 2036 reveals normal sinus rhythm with rate of 

86 BPM, no STEMI, there is significant global artifact from LVAD. This EKG was 

reviewed and interpreted by Dr. Lee.





Re-Evaluation





- Re-Evaluation


  ** First Eval


Re-Evaluation Time: 22:53


Comment: Nurse Barrera spoke with Dr. Taylor from [facility]. 10 

mg IV vitamin K and discharge to home with outpatient follow up is recommended.





Complex Multi-Symp Course/Dx


Course Of Treatment: 69-year-old male presents from home by ambulance with 

complaints of elevated INR and weakness.  He states he follows with the LVAD 

team in [place].  They get his blood work drawn every Monday.  They have been 

following it and it has been high.  He states his wife was concerned about this 

so he came to the ER.  He also states he's been weak over the last 2 weeks.  

Sleeping more than usual.  No fevers.  No vomiting or diarrhea.  No urinary 

complaints.  Workup demonstrates INR greater than 10.  No other significant 

acute changes.  Context with LVAD team made.  They recommend vitamin K, 10 

units IV.  Discharged to home.  Follow up with them tomorrow.  During ED course

, patient received vitamin K as advised.  Also received his usual medicines of 

tramadol and meropenem.  Tramadol 50 mg PO, gabapentin.





- Diagnoses


Provider Diagnoses: 


 Elevated INR, Generalized weakness








Discharge ED





- Sign-Out/Discharge


Documenting (check all that apply): Patient Departure - discharge 





- Discharge Plan


Condition: Stable


Disposition: HOME


Patient Education Materials:  Weakness (ED), Elevated INR (ED)


Referrals: 


Segundo JAMES,Samuel PASCUAL [Primary Care Provider] - 1 Day


Additional Instructions: 


PLEASE RETURN TO ED FOR ANY NEW OR WORSENING SYMPTOMS. PLEASE FOLLOW UP WITH 

YOUR DOCTORS AT [facility].





- Billing Disposition and Condition


Condition: STABLE


Disposition: Home





- Attestation Statements


Document Initiated by Scribe: Yes


Documenting Scribe: HERMINIO OTT


Provider For Whom Zoltanibe is Documenting (Include Credential): ENDER LEE MD


Scribe Attestation: 


I, HERMINIO OTT, scribed for ENDER LEE MD on 12/19/19 at 0534. 


Scribe Documentation Reviewed: Yes


Provider Attestation: 


The documentation as recorded by the scribeHERMINIO accurately reflects 

the service I personally performed and the decisions made by me, ENDER LEE MD


Status of Scribe Document: Viewed

## 2019-12-19 NOTE — ED
Imaging and Labs Follow Up


Follow Up Type: Imaging


Imaging Result: 


IMPRESSION: #. Pulmonary vascular congestion and interstitial edema. #. 

Additional patchy bilateral alveolar opacities concerning for potential 

superimposed pneumonia. Correlate with clinical assessment. #. Negative for 

pleural effusions.





Patient Communication/Plan: 


patient is know to have pulmonary vascular congestion and high wbc. has follow 

up with strong who is managing care so will not change anything at this time





Provider Diagnoses: 


 Elevated INR, Generalized weakness

## 2019-12-19 NOTE — ED
Complex/Multi-Sys Presentation





- HPI Summary


HPI Summary: 


Patient is a 70 y/o M w/ LVAD who presents to [facility] with positive blood culture 

for gram negative bacilli in one aerobic bottle. Patient was evaluated during 

evening of 12/18/19 for elevated INR. Patient's LVAD team from [facility] 

was contacted, it was recommended that the patient receive vitamin K and be 

discharged to home with follow up at [place]. Patient had contacted LVAD team 

when he was informed of positive blood cultures today, 12/19/19. It was 

recommended that the patient come to [facility] for evaluation and then be 

subsequently send to [facility] for further workup. Patient is noted to be 

currently on meropenem. In the room, patient states that he feels like  "

someone has been throwing baseballs at me". Some SOB and left hip pain are 

endorsed. Family in room claim that the patient has been rapidly declining over 

the past few weeks. They report that the patient has been having memory loss 

and "dementia issues". They state that the patient had claimed he had been 

experiencing a "leaving his body" sensation. Generalized weakness is also 

endorsed, it is noted that there have been some episodes when the patient would 

sit on the floor secondary to fatigue. Family notes that the patient's sclera 

have appeared icteric for the past few days as well. On RN assessment, nothing 

is noted to aggravate/alleviate Sx. PMHx of diabetes, cardiac arrest, CHF, CAD, 

HTN, MI, PNA, PE, sleep apnea noted. Home medications and allergies are 

reviewed. 





- History Of Current Complaint


Chief Complaint: EDGeneral


Time Seen by Provider: 12/19/19 20:08


Hx Obtained From: Patient, Family/Caretaker


Onset/Duration: Lasting Weeks, Still Present


Timing: Constant


Location: Pain At: - left hip


Aggravating Factor(s): nothing


Alleviating Factor(s): nothing


Associated Signs And Symptoms: Positive: Weakness - generalized, SOB, Other - 

positive - left hip pain, generalized weakness and fatigue, memory issues, 

leaving body sensation, icteric sclera





- Allergies/Home Medications


Allergies/Adverse Reactions: 


 Allergies











Allergy/AdvReac Type Severity Reaction Status Date / Time


 


duloxetine [From Cymbalta] AdvReac  Shakes Verified 12/19/19 19:45


 


PACEMAKER-MRI CONTRAINDICATED Allergy Severe Unknown Uncoded 12/19/19 19:45





   Reaction  





   Details  














PMH/Surg Hx/FS Hx/Imm Hx


Endocrine/Hematology History: Reports: Hx Anticoagulant Therapy - coumadin, Hx 

Blood Transfusions, Hx Diabetes, Hx Unexplained Bleeding - too much coumadin 10 

years ago


   Denies: Hx Blood Disorders, Hx Systemic Lupus Erythematosus, Hx Sickle Cell 

Disease, Hx Thyroid Disease, Hx Anemia


Cardiovascular History: Reports: Hx Angina, Hx Angioplasty, Hx Auto Implanted 

Cardiovert Defib, Hx Cardiac Arrest, Hx Cardiomegaly, Hx Congestive Heart 

Failure, Hx Coronary Artery Disease, Hx Hypercholesterolemia, Hx Hypertension, 

Hx Myocardial Infarction, Hx Pacemaker/ICD - PACEMAKER/DIBFIBRILLATOR 18 MONTHS 

AGO, Hx Syncope, Other Cardiovascular Problems/Disorders - IDDM      ON COUMADIN


   Denies: Hx Congenital Heart Disease, Hx Deep Vein Thrombosis, Hx Embolism, 

Hx Hypotension, Hx Peripheral Vascular Disease, Hx Rheumatic Fever, Hx Valvular 

Heart Disease


Respiratory History: Reports: Hx Pleural Effusion - 3 yrs ago, Hx Pneumonia - 

12 years ago, Hx Pulmonary Edema, Hx Seasonal Allergies, Hx Sleep Apnea


   Denies: Hx Asthma, Hx Chronic Bronchitis, Hx Chronic Obstructive Pulmonary 

Disease (COPD), Hx Cystic Fibrosis, Hx Lung Cancer, Hx Pulmonary Embolism


 History: Reports: Hx Kidney Stones - 2007


Musculoskeletal History: 


   Denies: Hx Rheumatoid Arthritis, Hx Osteoporosis - OSTEOPENIA


Sensory History: Reports: Hx Cataracts, Hx Contacts or Glasses - for reading


   Denies: Hx Eye Injury, Hx Eye Prosthesis, Hx Glaucoma, Hx Legally Blind, Hx 

Macular Degeneration, Hx Vision Problem, Hx Deafness, Hx Hearing Aid, Hx 

Hearing Problem


Opthamlomology History: Reports: Hx Cataracts, Hx Contacts or Glasses - for 

reading


   Denies: Hx Eye Injury, Hx Eye Prosthesis, Hx Glaucoma, Hx Legally Blind, Hx 

Macular Degeneration, Hx Vision Problem


Neurological History: Reports: Hx Nerve Disease - neuropathy in both feet


   Denies: Hx Dementia, Hx Developmental Delay, Hx Headaches, Hx Migraine, Hx 

Seizures, Hx Spinal Cord Injury, Hx Transient Ischemic Attacks (TIA), Other 

Neuro Impairments/Disorders


Psychiatric History: Reports: Hx Depression


   Denies: Hx Anxiety, Hx Attention Deficit Hyperactivity Disorder, Hx Eating 

Disorder, Hx Panic Disorder, Hx Post Traumatic Stress Disorder, Hx Inpatient 

Treatment, Hx Community Mental Health Tx, Hx Schizophrenia, Hx Bipolar Disorder

, Hx Suicide Attempt, Hx of Violent Episodes Against Others, Hx Substance Abuse





- Surgical History


Surgery Procedure, Year, and Place: TRANSESOPHAGEAL CARDIAC STUDY X3(TO CHECK 

VALVES); QUADRUPLE BYPASS; 6 STENTS(DOES NOT HAVE CARDS)LAST STENT PLACED 5/2016

; PACEMAKER


Hx Anesthesia Reactions: Yes - spouse states she was told he shouldnt have 

anesthesia again


Infectious Disease History: No


Infectious Disease History: Reports: Hx Shingles - 10 years ago


   Denies: Hx Clostridium Difficile, Hx Hepatitis, Hx Human Immunodeficiency 

Virus (HIV), Hx of Known/Suspected MRSA, Hx Tuberculosis, Hx Known/Suspected VRE

, Hx Known/Suspected VRSA, History Other Infectious Disease, Traveled Outside 

the [place] in Last 30 Days





- Family History


Known Family History: Positive: Cardiac Disease, Hypertension, Diabetes





- Social History


Alcohol Use: Rare


Alcohol Amount: 1-2 beers


Hx Substance Use: No


Substance Use Type: Reports: None


Hx Tobacco Use: Yes - not currently


Smoking Status (MU): Former Smoker





Review of Systems





- ROS Summary


Review of Systems Summary: 





 Home Medications











 Medication  Instructions  Recorded  Confirmed  Type


 


Lisinopril TAB* [Prinivil TAB 10 5 mg PO QPM 02/20/16 12/19/19 History





MG*]    


 


Ferrous Sulfate TAB* 325 mg PO DAILY 11/24/19 12/19/19 History


 


Metoprolol Succinate XL TAB* 50 mg PO DAILY 11/24/19 12/19/19 History





[Toprol XL TAB*]    


 


Polyethylene Glycol 3350* 17 gm PO DAILY PRN 11/24/19 12/19/19 History





[Miralax*]    


 


Spironolactone [Aldactone 25 MG-] 25 mg PO DAILY 11/24/19 12/19/19 History


 


amLODIPine TAB* [Norvasc 5 mg TAB*] 10 mg PO DAILY 11/24/19 12/19/19 History


 


Acetaminophen [Acetaminophen Extra 1,000 mg PO Q8HR PRN 12/18/19 12/19/19 

History





Strength]    


 


Amiodarone TAB* [Cordarone TAB*] 200 mg PO BID 12/18/19 12/19/19 History


 


Aspirin EC TAB* [Ecotrin EC Low 81 mg PO DAILY 12/18/19 12/19/19 History





Dose 81 MG*]    


 


Calcium Carbonate 650 mg PO BID WITH MEALS 12/18/19 12/19/19 History


 


Fluconazole 100 MG TAB* [Diflucan 400 mg PO DAILY 12/18/19 12/19/19 History





100 MG TAB*]    


 


Gabapentin TAB(NF) [Neurontin 600 600 mg PO QID 12/18/19 12/19/19 History





mg TAB(NF)]    


 


Insulin Glargine,Hum.rec.anlog 30 unit SUBCUT QPM 12/18/19 12/19/19 History





[Dawsonagldavy Garciapen]    


 


Insulin LISPRO* [HumaLOG*] 16 units SUBCUT TID WITH MEALS 12/18/19 12/19/19 

History


 


Magnesium Oxide TAB* [MagOx 400 400 mg PO BID 12/18/19 12/19/19 History





TAB*]    


 


Melatonin [Meladox] 3 mg PO BEDTIME PRN 12/18/19 12/19/19 History


 


Methyl Salicylate/Menth/Camph 1 cre TOPICAL TID PRN 12/18/19 12/19/19 History





[Bengay Ultra Strength]    


 


Senna TAB 8.6 mg* [Senokot 8.6 mg 1 tab PO QPM 12/18/19 12/19/19 History





TAB*]    


 


Torsemide 10 mg PO DAILY 12/18/19 12/19/19 History


 


Warfarin TAB(*) [Coumadin TAB(*)] 6 mg PO DAILY 12/18/19 12/19/19 History


 


rOPINIRole TAB* [Requip TAB*] 1 mg PO QID 12/18/19 12/19/19 History


 


traMADol TAB* [Ultram*] 50 mg PO Q4HR PRN 12/18/19 12/19/19 History











Constitutional: Other - positive - leaving body sensation 


Positive: Fatigue - generalized weakness 


Eyes: Other - positive - icteric sclera 


Positive: Shortness Of Breath


Musculoskeletal: Other - positive - left hip pain 


Neurological: Other - positive - memory issues 


All Other Systems Reviewed And Are Negative: Yes





Physical Exam





- Summary


Physical Exam Summary: 


General: Well-developed, Well-nourished, Moderately Ill-Appearing Male. No 

acute distress.


HEENT: Normocephalic, Atraumatic. 


              Eyes: Conjuctiva normal, PERRL.


              Oropharynx: Clear, mucous membranes moist, (-) exudates. 


Neck: Soft, FROM, (-) lymphadenopathy, (-) thyromegaly, (-) JVD.


Cardiovascular: Normal sinus rhythm, (-) murmur.


Lungs: Bibasilar crackles noted; (-) wheezes, (-) rales, (-) rhonchi.


Abdomen: Soft, non-tender, non-distended, (-) organomegaly, normal bowel sounds.


Back: (-) CVA tenderness


Extremities: 1+ BLE edema.


Skin: Warm, dry, (-) rash.


Neuro: Alert and oriented x3, no focal deficits.


Psychiatric: Mood normal, affect normal.








Triage Information Reviewed: Yes


Vital Signs On Initial Exam: 


 Initial Vitals











Temp Pulse Resp BP Pulse Ox


 


 98.4 F   80   16   0/0   82 


 


 12/19/19 19:30  12/19/19 19:30  12/19/19 19:30  12/19/19 19:30  12/19/19 19:30











Vital Signs Reviewed: Yes





Procedures





- Sedation


Patient Received Moderate/Deep Sedation with Procedure: No





Diagnostics





- Vital Signs


 Vital Signs











  Temp Pulse Resp BP Pulse Ox


 


 12/19/19 19:30  98.4 F  80  16  0/0  82














- Laboratory


Result Diagrams: 


 12/19/19 20:22





 12/19/19 20:22


Lab Statement: Any lab studies that have been ordered have been reviewed, and 

results considered in the medical decision making process.





- Radiology


  ** CXR


Radiology Interpretation Completed By: ED Physician


Summary of Radiographic Findings: CXR shows CHF, pending official report.





- EKG


  ** 2027


Cardiac Rate: NL - rate of 80 BPM


Summary of EKG Findings: EKG showed NSR with rate of 80 BPM, no STEMI. ED 

physician has reviewed and interpreted this EKG.





Complex Multi-Symp Course/Dx


Course Of Treatment: 69-year-old male presents to the hospital for positive 

blood culture.  Patient seen last night in the emergency room for weakness and 

elevated INR.  After workup and discussion with his LVAD team in [place] 

patient was given vitamin K IV and discharged to home.  Patient had blood 

cultures drawn at that time.  One of them returned today with gram-negative 

bacilli.  Patient was called to be admitted.  During ED course, patient 

received meropenem 2 gm as his regularly scheduled medicine.  Discussed with 

LVAD team.  Patient accepted in transfer to [facility], Dr. Taylor.  

Transferred by ambulance.





- Diagnoses


Provider Diagnoses: 


 Sepsis








- Physician Notifications


Discussed Care Of Patient With: Keyshawn Taylor


Time Discussed With Above Provider: 22:29


Instructed by Provider To: Other - 2229 - Patient's case had been discussed 

with Dr. Taylor from [facility]. Dr. Taylor accepts the patient 

for transfer.





Discharge ED





- Sign-Out/Discharge


Documenting (check all that apply): Patient Departure - transfer 





- Discharge Plan


Condition: Stable


Disposition: TRANS HIGHER LVL OF CARE FAC


Referrals: 


Segundo JAMES,Samuel PASCUAL [Primary Care Provider] - 





- Billing Disposition and Condition


Condition: STABLE


Disposition: Trans Higher Lvl of Care Fac





- Attestation Statements


Document Initiated by Scribe: Yes


Documenting Scribe: HERMINIO OTT


Provider For Whom Scribe is Documenting (Include Credential): ENDER LAGUNAS MD


Scribe Attestation: 


I, HERMINIO OTT, scribed for ENDER LAGUNAS MD on 12/20/19 at 0055. 


Scribe Documentation Reviewed: Yes


Provider Attestation: 


The documentation as recorded by the HERMINIO del cid accurately reflects 

the service I personally performed and the decisions made by me, ENDER LAGUNAS MD


Status of Scribe Document: Viewed

## 2019-12-19 NOTE — ED
Imaging and Labs Follow Up


Follow Up Type: Labs/Cultures


Labs/Culture Result: 





Blood cultures returned showing gram-negative bacilli.  These cultures were 

obtained on 2/18/2019 at approximately 2015.


Patient Communication/Plan: 





Patient was informed of positive blood culture result recently.


Provider Diagnoses: 


 Elevated INR, Generalized weakness

## 2019-12-19 NOTE — ED
Imaging and Labs Follow Up


Follow Up Type: Labs/Cultures


Labs/Culture Result: 


blood culture positive for gram neg bacilli in one aerobic bottle





Patient Communication/Plan: 


called and left vm explain needs to return to hospital ASAP. 


called wife and spoke with wife. who said to call [facility] at [phone] and 

speak to fermin. spoke with fermin who states she has relayed this to her 

providers and will call patient back. faxed results to [facility]. 





on call [facility] number [phone]


Provider Diagnoses: 


 Elevated INR, Generalized weakness

## 2019-12-20 NOTE — ED
Imaging and Labs Follow Up


Follow Up Type: Labs/Cultures


Labs/Culture Result: 


preliminary culture grew gram neg bacilli.





Patient Communication/Plan: 


patient was transferred to [place] for known sepsis. donovan will fax lab results 

preliminary to [facility] but final will need to be faxed when available. 





Provider Diagnoses: 


 Sepsis